# Patient Record
Sex: MALE | Race: BLACK OR AFRICAN AMERICAN | Employment: OTHER | URBAN - METROPOLITAN AREA
[De-identification: names, ages, dates, MRNs, and addresses within clinical notes are randomized per-mention and may not be internally consistent; named-entity substitution may affect disease eponyms.]

---

## 2017-05-01 ENCOUNTER — THERAPY VISIT (OUTPATIENT)
Dept: PHYSICAL THERAPY | Facility: CLINIC | Age: 72
End: 2017-05-01
Payer: MEDICARE

## 2017-05-01 ENCOUNTER — OFFICE VISIT (OUTPATIENT)
Dept: FAMILY MEDICINE | Facility: CLINIC | Age: 72
End: 2017-05-01
Payer: MEDICARE

## 2017-05-01 VITALS
TEMPERATURE: 98.6 F | OXYGEN SATURATION: 100 % | HEART RATE: 68 BPM | HEIGHT: 74 IN | SYSTOLIC BLOOD PRESSURE: 128 MMHG | BODY MASS INDEX: 22.46 KG/M2 | RESPIRATION RATE: 16 BRPM | DIASTOLIC BLOOD PRESSURE: 68 MMHG | WEIGHT: 175 LBS

## 2017-05-01 DIAGNOSIS — Z98.890 S/P ROTATOR CUFF REPAIR: Primary | ICD-10-CM

## 2017-05-01 DIAGNOSIS — Z98.890 S/P ROTATOR CUFF REPAIR: ICD-10-CM

## 2017-05-01 DIAGNOSIS — M75.111 INCOMPLETE TEAR OF RIGHT ROTATOR CUFF: Primary | ICD-10-CM

## 2017-05-01 DIAGNOSIS — Z98.890 H/O REPAIR OF RIGHT ROTATOR CUFF: ICD-10-CM

## 2017-05-01 PROCEDURE — 97161 PT EVAL LOW COMPLEX 20 MIN: CPT | Mod: KX

## 2017-05-01 PROCEDURE — 99213 OFFICE O/P EST LOW 20 MIN: CPT | Performed by: PHYSICIAN ASSISTANT

## 2017-05-01 PROCEDURE — 97110 THERAPEUTIC EXERCISES: CPT | Mod: KX

## 2017-05-01 PROCEDURE — G8985 CARRY GOAL STATUS: HCPCS | Mod: GP

## 2017-05-01 PROCEDURE — G8984 CARRY CURRENT STATUS: HCPCS | Mod: GP

## 2017-05-01 RX ORDER — OMEGA-3-ACID ETHYL ESTERS 1 G/1
2 CAPSULE, LIQUID FILLED ORAL 2 TIMES DAILY
COMMUNITY

## 2017-05-01 NOTE — PROGRESS NOTES
Subjective:    Patient is a 71 year old male presenting with rehab right shoulder hpi.   Ad Bryan is a 71 year old male with a right shoulder condition.  Condition occurred with:  Other.  Condition occurred: other.  This is a new condition  S/P R RCR 3/13/17.  PT goal: basketball and golf .    Patient reports pain:  In the joint.     and is intermittent and reported as 3/10.  Associated symptoms:  Loss of strength and loss of motion/stiffness.   Symptoms are exacerbated by certain positions, using arm overhead and using arm at shoulder level and relieved by rest.  Since onset symptoms are gradually improving.                                                      Objective:    System                   Shoulder Evaluation:  ROM:  AROM:    Flexion:  Right:  95    Abduction:  Right:  85      External Rotation:  Right:  45                      Strength:  : R shoulder MMT deferred at this time; substitution of UT with AROM                                                                General     ROS    Assessment/Plan:      Patient is a 71 year old male with right side shoulder complaints.    Patient has the following significant findings with corresponding treatment plan.                Diagnosis 1:  S/P R RCR on 3/13/17. Decreased ROM/flexibility - manual therapy and therapeutic exercise  Decreased strength - therapeutic exercise and therapeutic activities  Impaired muscle performance - neuro re-education  Decreased function - therapeutic activities    Therapy Evaluation Codes:   1) History comprised of:   Personal factors that impact the plan of care:      None.    Comorbidity factors that impact the plan of care are:      None.     Medications impacting care: None.  2) Examination of Body Systems comprised of:   Body structures and functions that impact the plan of care:      Shoulder.   Activity limitations that impact the plan of care are:      Lifting and reaching .  3) Clinical presentation characteristics  are:   Stable/Uncomplicated.  4) Decision-Making    Low complexity using standardized patient assessment instrument and/or measureable assessment of functional outcome.  Cumulative Therapy Evaluation is: Low complexity.    Previous and current functional limitations:  (See Goal Flow Sheet for this information)    Short term and Long term goals: (See Goal Flow Sheet for this information)     Communication ability:  Patient appears to be able to clearly communicate and understand verbal and written communication and follow directions correctly.  Treatment Explanation - The following has been discussed with the patient:   RX ordered/plan of care  Anticipated outcomes  Possible risks and side effects  This patient would benefit from PT intervention to resume normal activities.   Rehab potential is good.    Frequency:  3 X week, once daily  Duration:  for 8 weeks  Discharge Plan:  Achieve all LTG.  Independent in home treatment program.  Reach maximal therapeutic benefit.    Please refer to the daily flowsheet for treatment today, total treatment time and time spent performing 1:1 timed codes.

## 2017-05-01 NOTE — Clinical Note
Please abstract the following data from this visit with this patient into the appropriate field in Epic:  Colonoscopy done on this date: 04/2016 (approximately), by this group: [facility], results were normal.

## 2017-05-01 NOTE — PROGRESS NOTES
"Chief Complaint   Patient presents with     Referral     for PT       Initial /68  Pulse 68  Temp 98.6  F (37  C)  Resp 16  Ht 6' 2\" (1.88 m)  Wt 175 lb (79.4 kg)  SpO2 100%  BMI 22.47 kg/m2 Estimated body mass index is 22.47 kg/(m^2) as calculated from the following:    Height as of this encounter: 6' 2\" (1.88 m).    Weight as of this encounter: 175 lb (79.4 kg).  Medication Reconciliation: complete. SHADE Penaloza LPN      Patient would like referral for PT for Rt Shoulder. Had rotator cuff surgery 7 weeks ago in [place]. Wants to go to [facility] [place]. SHADE Penaloza LPN      SUBJECTIVE:                                                    Ad Bryan is a 71 year old male who presents to clinic today for the following health issues:    Ad had a right RC repair for partial tear, 7 weeks ago. Started physical therapy in [place], where he spends his gabriel - needs to continue in [place] and [facility] and needs a local order. Likely 20 wks total.     Please abstract the following data from this visit with this patient into the appropriate field in Epic:    Colonoscopy done on this date: 04/2016 (approximately), by this group: [facility], results were normal.       Problem list and histories reviewed & adjusted, as indicated.  Additional history: as documented    Patient Active Problem List   Diagnosis     Pain in joint, shoulder region     HYPERLIPIDEMIA LDL GOAL <130     S/P rotator cuff repair     Incomplete tear of right rotator cuff     Past Surgical History:   Procedure Laterality Date     COLONOSCOPY  2007    [place] [facility]     HC REPAIR SLIDING INGUINAL HERNIA       HEMORRHOIDECTOMY       ROTATOR CUFF REPAIR RT/LT Right 03/2017    in [place]       Social History   Substance Use Topics     Smoking status: Never Smoker     Smokeless tobacco: Not on file     Alcohol use Yes      Comment: social     Family History   Problem Relation Age of Onset     Respiratory Mother      Hypertension Father      Breast Cancer Sister      Breast " "Cancer Sister      Breast Cancer Sister          Current Outpatient Prescriptions   Medication Sig Dispense Refill     omega-3 acid ethyl esters (LOVAZA) 1 G capsule Take 2 g by mouth 2 times daily       atorvastatin (LIPITOR) 10 MG tablet Take 10 mg by mouth daily.       Multiple Vitamin (MULTIVITAMIN OR) Take  by mouth.       aspirin 81 MG EC tablet Take 81 mg by mouth daily.       Allergies   Allergen Reactions     Atorvastatin      Joint pain.       Simvastatin      Muscle ache     Labs reviewed in EPIC    Reviewed and updated as needed this visit by clinical staff  Tobacco  Allergies  Meds  Problems  Fam Hx  Soc Hx      Reviewed and updated as needed this visit by Provider  Problems         Social History     Social History     Marital status:      Spouse name: N/A     Number of children: 3     Years of education: N/A     Occupational History     [employer] -  Retired     Social History Main Topics     Smoking status: Never Smoker     Smokeless tobacco: None     Alcohol use Yes      Comment: social     Drug use: No     Sexual activity: Yes     Partners: Female     Other Topics Concern     None     Social History Narrative       10 point review of systems negative other than symptoms noted above.   Constitutional, HEENT, CV, pulmonary, GI, , MS, Endo, Psych systems are all negative, except as otherwise noted.       OBJECTIVE:  /68  Pulse 68  Temp 98.6  F (37  C)  Resp 16  Ht 6' 2\" (1.88 m)  Wt 175 lb (79.4 kg)  SpO2 100%  BMI 22.47 kg/m2  CONSTITUTIONAL: Alert, well-nourished, well-groomed, NAD  DERM: No rashes or suspicious lesions    Diagnostic Tests:  none    ASSESSMENT/PLAN:  (Z98.890) S/P rotator cuff repair  (primary encounter diagnosis)  Comment:   Plan: [facility] PT[facility] HAND, AND CHIR[facility]RACTIC REFERRAL            (Z98.890) H/O repair of right rotator cuff  Comment:   Plan: [facility] HAND, [facility] CHIROPRACTIC REFERRAL          physical therapy ordered for Ad today.   Updated " chart as well with recent vaccinations, colonoscopy.   Only issue is hyperlipidemia which is managed with medications.       FOLLOW-UP: With [facility] for physical therapy.   The patient agrees with this assessment and plan and agrees to call or return to the clinic with any questions or concerns or if their condition worsens.    MOHSEN Levine, PA-C  [facility]

## 2017-05-01 NOTE — MR AVS SNAPSHOT
After Visit Summary   5/1/2017    Ad Bryan    MRN: [id number]           Patient Information     Date Of Birth          1945        Visit Information        Provider Department      5/1/2017 11:40 AM Amanda Partida PA-C [facility] [place]        Today's Diagnoses     S/P rotator cuff repair    -  1    H/O repair of right rotator cuff           Follow-ups after your visit        Additional Services     [facility] HAND, AND CHIR[facility]RACTIC REFERRAL       **This order will print in the [facility] Scheduling Office**    Physical Therapy, Hand Therapy and Chiropractic Care are available through:    *[facility]  *[facility]  *[facility]    Call one number to schedule at any of the above locations: [phone].    Your provider has referred you to: Physical Therapy at [facility] or [facility]    Indication/Reason for Referral: Weekly physical therapy sessions following right rotator cuff repair, 7 weeks prior   Onset of Illness: as above  Therapy Orders: Evaluate and Treat  Special Programs: None  Special Request: None    Jero Nogueira      Additional Comments for the Therapist or Chiropractor:     Please be aware that coverage of these services is subject to the terms and limitations of your health insurance plan.  Call member services at your health plan with any benefit or coverage questions.      Please bring the following to your appointment:    *Your personal calendar for scheduling future appointments  *Comfortable clothing                  Your next 10 appointments already scheduled     May 05, 2017  9:50 AM CDT   [facility] Extremity with Vic Leigh PT   [facility]  [place] [facility] ([facility] [place])    [address] [place] [address]  [address]  [place] [address]   [phone]            May 08, 2017  9:40 AM CDT   [facility] Extremity with Nathan Iniguez PT   [facility]  [place]  "PhysicalTherapy ([facility] [place])    [address] [place] [address]  [address]  [place] [address] [phone]            May 10, 2017 10:20 AM CDT   [facility] Extremity with Nathan Iniguez, PT   [facility] [place])    [address] [place] [address]  [address]  [place] [address]   [phone]              Who to contact     If you have questions or need follow up information about today's clinic visit or your schedule please contact [facility] directly at [phone].  Normal or non-critical lab and imaging results will be communicated to you by [facility] [employer]art, letter or phone within 4 business days after the clinic has received the results. If you do not hear from us within 7 days, please contact the clinic through [facility] [employer] or phone. If you have a critical or abnormal lab result, we will notify you by phone as soon as possible.  Submit refill requests through InSightec or call your pharmacy and they will forward the refill request to us. Please allow 3 business days for your refill to be completed.          Additional Information About Your Visit        [facility] Labshar[employer]degrees Custom Information     InSightec gives you secure access to your electronic health record. If you see a primary care provider, you can also send messages to your care team and make appointments. If you have questions, please call your primary care clinic.  If you do not have a primary care provider, please call [phone] and they will assist you.        Care EveryWhere ID     This is your Care EveryWhere ID. This could be used by other organizations to access your [facility] medical records  [id number]        Your Vitals Were     Pulse Temperature Respirations Height Pulse Oximetry BMI (Body Mass Index)    68 98.6  F (37  C) 16 6' 2\" (1.88 m) 100% 22.47 kg/m2       Blood Pressure from Last 3 Encounters:   05/01/17 128/68   05/02/16 122/70   03/13/12 120/66    Weight from Last 3 Encounters:   05/01/17 175 lb " (79.4 kg)   05/02/16 173 lb (78.5 kg)   03/13/12 184 lb (83.5 kg)              We Performed the Following     [facility] PT, HAND, AND CHIROPRACTIC REFERRAL        Primary Care Provider Office Phone #    Sienna [place]ton [facility] [phone]       No address on file        Thank you!     Thank you for choosing [facility] [facility]  for your care. Our goal is always to provide you with excellent care. Hearing back from our patients is one way we can continue to improve our services. Please take a few minutes to complete the written survey that you may receive in the mail after your visit with us. Thank you!             Your Updated Medication List - Protect others around you: Learn how to safely use, store and throw away your medicines at www.disposemymeds.org.          This list is accurate as of: 5/1/17 11:51 AM.  Always use your most recent med list.                   Brand Name Dispense Instructions for use    aspirin 81 MG EC tablet      Take 81 mg by mouth daily.       LIPITOR 10 MG tablet   Generic drug:  atorvastatin      Take 10 mg by mouth daily.       MULTIVITAMIN PO      Take  by mouth.       omega-3 acid ethyl esters 1 G capsule    Lovaza     Take 2 g by mouth 2 times daily

## 2017-05-01 NOTE — MR AVS SNAPSHOT
After Visit Summary   5/1/2017    Ad Bryan    MRN: [id number]           Patient Information     Date Of Birth          1945        Visit Information        Provider Department      5/1/2017 9:40 AM Nathan Iniguez, [facility]        Today's Diagnoses     Incomplete tear of right rotator cuff    -  1    S/P rotator cuff repair           Follow-ups after your visit        Your next 10 appointments already scheduled     May 05, 2017  9:50 AM CDT   [facility] Extremity with Vic Leigh [facility] [place] [facility] ([facility] [place])    [address] [place] [address]  [address]  [place] [address]   [phone]            May 08, 2017  9:40 AM CDT   [facility] Extremity with Nathan Iniguez [facility] [place])    [address] [place] [address]  [address]  [place] [address]   [phone]            May 10, 2017 10:20 AM CDT   [facility] Extremity with [facility] Iniguez [facility] [place] [facility] ([facility] [place])    [address] [place] [address]  [address]  [place] [address]   [phone]              Who to contact     If you have questions or need follow up information about today's clinic visit or your schedule please contact [facility] directly at [phone].  Normal or non-critical lab and imaging results will be communicated to you by [email or web address], letter or phone within 4 business days after the clinic has received the results. If you do not hear from us within 7 days, please contact the clinic through [email or web address] or phone. If you have a critical or abnormal lab result, we will notify you by phone as soon as possible.  Submit refill requests through BitAccess or call your pharmacy and they will forward the refill request to us. Please allow 3 business days for your refill to be completed.           Additional Information About Your Visit        FORMA Therapeuticshart Information     Patient Conversation Media gives you secure access to your electronic health record. If you see a primary care provider, you can also send messages to your care team and make appointments. If you have questions, please call your primary care clinic.  If you do not have a primary care provider, please call [phone] and they will assist you.        Care EveryWhere ID     This is your Care EveryWhere ID. This could be used by other organizations to access your [facility] medical records  [id number]         Blood Pressure from Last 3 Encounters:   05/02/16 122/70   03/13/12 120/66   08/12/11 125/76    Weight from Last 3 Encounters:   05/02/16 78.5 kg (173 lb)   03/13/12 83.5 kg (184 lb)   08/12/11 82.6 kg (182 lb 1.6 oz)              We Performed the Following     HC PT EVAL, LOW COMPLEXITY     [facility] CERT REPORT     [facility] INITIAL EVAL REPORT     THERAPEUTIC EXERCISES        Primary Care Provider Office Phone #    [facility] [phone]       No address on file        Thank you!     Thank you for choosing [facility]  for your care. Our goal is always to provide you with excellent care. Hearing back from our patients is one way we can continue to improve our services. Please take a few minutes to complete the written survey that you may receive in the mail after your visit with us. Thank you!             Your Updated Medication List - Protect others around you: Learn how to safely use, store and throw away your medicines at www.disposemymeds.org.          This list is accurate as of: 5/1/17 10:40 AM.  Always use your most recent med list.                   Brand Name Dispense Instructions for use    ADVIL 200 MG capsule   Generic drug:  ibuprofen      Take 200 mg by mouth daily as needed.       aspirin 81 MG EC tablet      Take 81 mg by mouth daily.       LIPITOR 10 MG tablet   Generic drug:   atorvastatin      Take 10 mg by mouth daily.       MULTIVITAMIN PO      Take  by mouth.       VITAMIN B-12 PO      Take  by mouth.

## 2017-05-01 NOTE — LETTER
DEPARTMENT OF HEALTH AND HUMAN SERVICES  CENTERS FOR MEDICARE & MEDICAID SERVICES    PLAN/UPDATED PLAN OF PROGRESS FOR OUTPATIENT REHABILITATION    PATIENTS NAME:  Ad Bryan     : 1945    PROVIDER NUMBER:    [id number]    [id number]    PROVIDER NAME: [facility]  [facility]    MEDICAL RECORD NUMBER: [id number]     START OF CARE DATE:  SOC Date: 17   TYPE:  PT    PRIMARY/TREATMENT DIAGNOSIS: (Pertinent Medical Diagnosis)     Incomplete tear of right rotator cuff  S/P rotator cuff repair    VISITS FROM START OF CARE:  Rxs Used: 1       Subjective:    Patient is a 71 year old male presenting with rehab right shoulder hpi.   Ad Bryan is a 71 year old male with a right shoulder condition.  Condition occurred with:  Other.  Condition occurred: other.  This is a new condition  S/P R RCR 3/13/17.  PT goal: basketball and golf .    Patient reports pain:  In the joint.     and is intermittent and reported as 3/10.  Associated symptoms:  Loss of strength and loss of motion/stiffness.   Symptoms are exacerbated by certain positions, using arm overhead and using arm at shoulder level and relieved by rest.  Since onset symptoms are gradually improving.                        Pertinent medical history includes:  None.  Medical allergies: no.  Other surgeries include:  Orthopedic surgery and other (Shoulder, Hernia).  Current medications:  Other (Lipitor).  Current occupation is Retired.    Primary job tasks include:  Repetitive tasks and other (Computer Work).    Objective:       Shoulder Evaluation:  ROM:  AROM:    Flexion:  Right:  95  Abduction:  Right:  85  External Rotation:  Right:  45  Strength:  : R shoulder MMT deferred at this time; substitution of UT with AROM   PATIENTS NAME:  Ad Bryan     : 1945    Assessment/Plan:      Patient is a 71 year old male with right side shoulder complaints.    Patient has the following significant findings  with corresponding treatment plan.                Diagnosis 1:  S/P R RCR on 3/13/17. Decreased ROM/flexibility - manual therapy and therapeutic exercise  Decreased strength - therapeutic exercise and therapeutic activities  Impaired muscle performance - neuro re-education  Decreased function - therapeutic activities    Therapy Evaluation Codes:   1) History comprised of:   Personal factors that impact the plan of care:      None.    Comorbidity factors that impact the plan of care are:      None.     Medications impacting care: None.  2) Examination of Body Systems comprised of:   Body structures and functions that impact the plan of care:      Shoulder.   Activity limitations that impact the plan of care are:      Lifting and reaching .  3) Clinical presentation characteristics are:   Stable/Uncomplicated.  4) Decision-Making    Low complexity using standardized patient assessment instrument and/or measureable assessment of functional outcome.  Cumulative Therapy Evaluation is: Low complexity.    Previous and current functional limitations:  (See Goal Flow Sheet for this information)    Short term and Long term goals: (See Goal Flow Sheet for this information)     Communication ability:  Patient appears to be able to clearly communicate and understand verbal and written communication and follow directions correctly.  Treatment Explanation - The following has been discussed with the patient:   RX ordered/plan of care  Anticipated outcomes  Possible risks and side effects  This patient would benefit from PT intervention to resume normal activities.   Rehab potential is good.    Frequency:  3 X week, once daily  Duration:  for 8 weeks  Discharge Plan:  Achieve all LTG.  Independent in home treatment program.  Reach maximal therapeutic benefit.  PATIENTS NAME:  Ad Bryan     : 1945        Caregiver Signature/Credentials _____________________________ Date ________       Treating Provider: Nathan Iniguez PT  I  "have reviewed and certified the need for these services and plan of treatment while under my care.        PHYSICIAN'S SIGNATURE:   _________________________________________  Date___________   Villa Christiansen MD    Certification period:  Beginning of Cert date period: 05/01/17 to  End of Cert period date: 07/29/17     Functional Level Progress Report: Please see attached \"Goal Flow sheet for Functional level.\"    ____X____ Continue Services or       ________ DC Services                Service dates: From  SOC Date: 05/01/17 date to present                         "

## 2017-05-02 NOTE — PROGRESS NOTES
Subjective:    Patient is a 71 year old male presenting with rehab left ankle/foot hpi.                                      Pertinent medical history includes:  None.  Medical allergies: no.  Other surgeries include:  Orthopedic surgery and other (Shoulder, Hernia).  Current medications:  Other (Lipitor).  Current occupation is Retired.    Primary job tasks include:  Repetitive tasks and other (Computer Work).                                Objective:    System    Physical Exam    General     ROS    Assessment/Plan:

## 2017-05-05 ENCOUNTER — THERAPY VISIT (OUTPATIENT)
Dept: PHYSICAL THERAPY | Facility: CLINIC | Age: 72
End: 2017-05-05
Payer: MEDICARE

## 2017-05-05 DIAGNOSIS — M75.111 INCOMPLETE TEAR OF RIGHT ROTATOR CUFF: ICD-10-CM

## 2017-05-05 DIAGNOSIS — Z98.890 S/P ROTATOR CUFF REPAIR: ICD-10-CM

## 2017-05-05 PROCEDURE — 97110 THERAPEUTIC EXERCISES: CPT | Mod: GP | Performed by: PHYSICAL THERAPIST

## 2017-05-05 PROCEDURE — 97140 MANUAL THERAPY 1/> REGIONS: CPT | Mod: GP | Performed by: PHYSICAL THERAPIST

## 2017-05-08 ENCOUNTER — THERAPY VISIT (OUTPATIENT)
Dept: PHYSICAL THERAPY | Facility: CLINIC | Age: 72
End: 2017-05-08
Payer: MEDICARE

## 2017-05-08 DIAGNOSIS — Z98.890 S/P ROTATOR CUFF REPAIR: ICD-10-CM

## 2017-05-08 DIAGNOSIS — M75.111 INCOMPLETE TEAR OF RIGHT ROTATOR CUFF: ICD-10-CM

## 2017-05-08 PROCEDURE — 97110 THERAPEUTIC EXERCISES: CPT | Mod: GP

## 2017-05-10 ENCOUNTER — THERAPY VISIT (OUTPATIENT)
Dept: PHYSICAL THERAPY | Facility: CLINIC | Age: 72
End: 2017-05-10
Payer: MEDICARE

## 2017-05-10 DIAGNOSIS — Z98.890 S/P ROTATOR CUFF REPAIR: ICD-10-CM

## 2017-05-10 DIAGNOSIS — M75.111 INCOMPLETE TEAR OF RIGHT ROTATOR CUFF: ICD-10-CM

## 2017-05-10 PROCEDURE — 97110 THERAPEUTIC EXERCISES: CPT | Mod: GP

## 2017-05-10 PROCEDURE — 97112 NEUROMUSCULAR REEDUCATION: CPT | Mod: GP

## 2017-05-16 ENCOUNTER — THERAPY VISIT (OUTPATIENT)
Dept: PHYSICAL THERAPY | Facility: CLINIC | Age: 72
End: 2017-05-16
Payer: MEDICARE

## 2017-05-16 DIAGNOSIS — Z98.890 S/P ROTATOR CUFF REPAIR: ICD-10-CM

## 2017-05-16 DIAGNOSIS — M75.111 INCOMPLETE TEAR OF RIGHT ROTATOR CUFF: ICD-10-CM

## 2017-05-16 PROCEDURE — 97112 NEUROMUSCULAR REEDUCATION: CPT | Mod: GP

## 2017-05-16 PROCEDURE — 97110 THERAPEUTIC EXERCISES: CPT | Mod: GP

## 2017-05-19 ENCOUNTER — THERAPY VISIT (OUTPATIENT)
Dept: PHYSICAL THERAPY | Facility: CLINIC | Age: 72
End: 2017-05-19
Payer: MEDICARE

## 2017-05-19 DIAGNOSIS — Z98.890 S/P ROTATOR CUFF REPAIR: ICD-10-CM

## 2017-05-19 DIAGNOSIS — M75.111 INCOMPLETE TEAR OF RIGHT ROTATOR CUFF: ICD-10-CM

## 2017-05-19 PROCEDURE — 97110 THERAPEUTIC EXERCISES: CPT | Mod: GP

## 2017-05-19 PROCEDURE — 97112 NEUROMUSCULAR REEDUCATION: CPT | Mod: GP

## 2017-05-22 ENCOUNTER — THERAPY VISIT (OUTPATIENT)
Dept: PHYSICAL THERAPY | Facility: CLINIC | Age: 72
End: 2017-05-22
Payer: MEDICARE

## 2017-05-22 DIAGNOSIS — Z98.890 S/P ROTATOR CUFF REPAIR: ICD-10-CM

## 2017-05-22 DIAGNOSIS — M75.111 INCOMPLETE TEAR OF RIGHT ROTATOR CUFF: ICD-10-CM

## 2017-05-22 PROCEDURE — 97110 THERAPEUTIC EXERCISES: CPT | Mod: GP

## 2017-05-22 PROCEDURE — 97112 NEUROMUSCULAR REEDUCATION: CPT | Mod: GP

## 2017-05-24 ENCOUNTER — THERAPY VISIT (OUTPATIENT)
Dept: PHYSICAL THERAPY | Facility: CLINIC | Age: 72
End: 2017-05-24
Payer: MEDICARE

## 2017-05-24 DIAGNOSIS — Z98.890 S/P ROTATOR CUFF REPAIR: ICD-10-CM

## 2017-05-24 DIAGNOSIS — M75.111 INCOMPLETE TEAR OF RIGHT ROTATOR CUFF: ICD-10-CM

## 2017-05-24 PROCEDURE — 97110 THERAPEUTIC EXERCISES: CPT | Mod: KX

## 2017-05-24 PROCEDURE — 97112 NEUROMUSCULAR REEDUCATION: CPT | Mod: KX

## 2017-05-30 ENCOUNTER — THERAPY VISIT (OUTPATIENT)
Dept: PHYSICAL THERAPY | Facility: CLINIC | Age: 72
End: 2017-05-30
Payer: MEDICARE

## 2017-05-30 DIAGNOSIS — Z98.890 S/P ROTATOR CUFF REPAIR: ICD-10-CM

## 2017-05-30 DIAGNOSIS — M75.111 INCOMPLETE TEAR OF RIGHT ROTATOR CUFF: ICD-10-CM

## 2017-05-30 PROCEDURE — 97140 MANUAL THERAPY 1/> REGIONS: CPT | Mod: KX

## 2017-05-30 PROCEDURE — 97110 THERAPEUTIC EXERCISES: CPT | Mod: KX

## 2017-06-13 ENCOUNTER — THERAPY VISIT (OUTPATIENT)
Dept: PHYSICAL THERAPY | Facility: CLINIC | Age: 72
End: 2017-06-13
Payer: MEDICARE

## 2017-06-13 DIAGNOSIS — Z98.890 S/P ROTATOR CUFF REPAIR: ICD-10-CM

## 2017-06-13 DIAGNOSIS — M75.111 INCOMPLETE TEAR OF RIGHT ROTATOR CUFF: ICD-10-CM

## 2017-06-13 PROCEDURE — 97110 THERAPEUTIC EXERCISES: CPT | Mod: GP

## 2017-06-13 PROCEDURE — 97112 NEUROMUSCULAR REEDUCATION: CPT | Mod: GP

## 2017-06-28 ENCOUNTER — THERAPY VISIT (OUTPATIENT)
Dept: PHYSICAL THERAPY | Facility: CLINIC | Age: 72
End: 2017-06-28
Payer: MEDICARE

## 2017-06-28 DIAGNOSIS — M75.111 INCOMPLETE TEAR OF RIGHT ROTATOR CUFF: ICD-10-CM

## 2017-06-28 DIAGNOSIS — Z98.890 S/P ROTATOR CUFF REPAIR: ICD-10-CM

## 2017-06-28 PROCEDURE — 97110 THERAPEUTIC EXERCISES: CPT | Mod: GP

## 2017-06-28 PROCEDURE — 97140 MANUAL THERAPY 1/> REGIONS: CPT | Mod: KX

## 2017-06-28 NOTE — MR AVS SNAPSHOT
After Visit Summary   6/28/2017    Ad Bryan    MRN: [id number]           Patient Information     Date Of Birth          1945        Visit Information        Provider Department      6/28/2017 9:40 AM Nathan Iniguez PT [facility] [place] [facility]        Today's Diagnoses     Incomplete tear of right rotator cuff        S/P rotator cuff repair           Follow-ups after your visit        Your next 10 appointments already scheduled     Jul 19, 2017  9:40 AM CDT   CLEMENTINA Extremity with Nathan Iniguez PT   [facility] ([facility] [place])    [address] [place] [address]  [address]  [place] [address] [phone]              Who to contact     If you have questions or need follow up information about today's clinic visit or your schedule please contact [facility] directly at [phone].  Normal or non-critical lab and imaging results will be communicated to you by Naplyrics.comhart, letter or phone within 4 business days after the clinic has received the results. If you do not hear from us within 7 days, please contact the clinic through QderoPateo Communicationst or phone. If you have a critical or abnormal lab result, we will notify you by phone as soon as possible.  Submit refill requests through Neronote or call your pharmacy and they will forward the refill request to us. Please allow 3 business days for your refill to be completed.          Additional Information About Your Visit        MyChart Information     Neronote gives you secure access to your electronic health record. If you see a primary care provider, you can also send messages to your care team and make appointments. If you have questions, please call your primary care clinic.  If you do not have a primary care provider, please call [phone] and they will assist you.        Care EveryWhere ID     This is your Care EveryWhere ID.  This could be used by other organizations to access your [place] medical records  [id number]         Blood Pressure from Last 3 Encounters:   05/01/17 128/68   05/02/16 122/70   03/13/12 120/66    Weight from Last 3 Encounters:   05/01/17 79.4 kg (175 lb)   05/02/16 78.5 kg (173 lb)   03/13/12 83.5 kg (184 lb)              We Performed the Following     MANUAL THER TECH,1+REGIONS,EA 15 MIN     THERAPEUTIC EXERCISES        Primary Care Provider Office Phone #    [facility] [phone]       No address on file        Equal Access to Services     NAN GAGE : Hadii aad ku hadasho Soomaali, waaxda luqadaha, qaybta kaalmada adeegyada, ti mendoza . So [facility] [phone].    ATENCIÓN: Si habla español, tiene a moore disposición servicios gratuitos de asistencia lingüística. Llame al [phone].    We comply with applicable federal civil rights laws and [place] laws. We do not discriminate on the basis of race, color, national origin, age, disability sex, sexual orientation or gender identity.            Thank you!     Thank you for choosing [facility]  [facility] your [facility]. Our goal is always to provide you with excellent care. Hearing back from our patients is one way we can continue to improve our services. Please take a few minutes to complete the written survey that you may receive in the mail after your visit with us. Thank you!             Your Updated Medication List - Protect others around you: Learn how to safely use, store and throw away your medicines at www.disposemymeds.org.          This list is accurate as of: 6/28/17 10:53 AM.  Always use your most recent med list.                   Brand Name Dispense Instructions for use Diagnosis    aspirin 81 MG EC tablet      Take 81 mg by mouth daily.        LIPITOR 10 MG tablet   Generic drug:  atorvastatin      Take 10 mg by mouth daily.        MULTIVITAMIN PO      Take   by mouth.        omega-3 acid ethyl esters 1 G capsule    Lovaza     Take 2 g by mouth 2 times daily

## 2017-07-28 ENCOUNTER — THERAPY VISIT (OUTPATIENT)
Dept: PHYSICAL THERAPY | Facility: CLINIC | Age: 72
End: 2017-07-28
Payer: MEDICARE

## 2017-07-28 DIAGNOSIS — M75.111 INCOMPLETE TEAR OF RIGHT ROTATOR CUFF: ICD-10-CM

## 2017-07-28 DIAGNOSIS — Z98.890 S/P ROTATOR CUFF REPAIR: ICD-10-CM

## 2017-07-28 PROCEDURE — 97112 NEUROMUSCULAR REEDUCATION: CPT | Mod: GP

## 2017-07-28 PROCEDURE — 97110 THERAPEUTIC EXERCISES: CPT | Mod: GP

## 2017-07-28 PROCEDURE — G8985 CARRY GOAL STATUS: HCPCS | Mod: GP

## 2017-07-28 PROCEDURE — G8984 CARRY CURRENT STATUS: HCPCS | Mod: GP

## 2017-07-28 NOTE — PROGRESS NOTES
Subjective:    HPI                    Objective:    System                   Shoulder Evaluation:  ROM:  AROM:    Flexion:  Right:  165    Abduction:  Right:  146    Internal Rotation:  Right:  50  External Rotation:  Right:  70                      Strength:  : HHD in #'s R/L:  Flex=12.4/15; Scap=12.1/15; ER @0 = 18.0/23.2; ER @ 90 = 14.7/18.                                                               General     ROS    Assessment/Plan:      PROGRESS  REPORT    SUBJECTIVE  Subjective changes noted by patient:  Feeling a little better.  Been doing a lot of traveling but working hard on ex's.        Changes in function:  Yes (See Goal flowsheet attached for changes in current functional level)  Adverse reaction to treatment or activity: None    OBJECTIVE  Changes noted in objective findings:  The objective findings below are from DOS 7/28/17.  Objective: Flex=165     ASSESSMENT/PLAN  Updated problem list and treatment plan: Diagnosis 1:  RCR with development of s&sx consistent with frozen shoulder  Decreased ROM/flexibility - manual therapy and therapeutic exercise  Decreased strength - therapeutic exercise and therapeutic activities  Impaired muscle performance - neuro re-education  Decreased function - therapeutic activities  STG/LTGs have been met or progress has been made towards goals:  Yes (See Goal flow sheet completed today.)  Assessment of Progress: The patient's condition is improving.  The patient's condition has potential to improve.  Self Management Plans:  Patient has been instructed in a home treatment program.  I have re-evaluated this patient and find that the nature, scope, duration and intensity of the therapy is appropriate for the medical condition of the patient.  Ad continues to require the following intervention to meet STG and LTG's:  PT    Recommendations:  This patient would benefit from continued therapy.     Frequency:  1 X a month, twice daily  Duration:  for 3  months          Please refer to the daily flowsheet for treatment today, total treatment time and time spent performing 1:1 timed codes.

## 2017-07-28 NOTE — LETTER
DEPARTMENT OF HEALTH AND HUMAN SERVICES  CENTERS FOR MEDICARE & MEDICAID SERVICES    PLAN/UPDATED PLAN OF PROGRESS FOR OUTPATIENT REHABILITATION    PATIENTS NAME:  Ad Bryan     : 1945    PROVIDER NUMBER:    [id number]    [facility]    PROVIDER NAME: [facility]    MEDICAL RECORD NUMBER: [id number]     START OF CARE DATE:  SOC Date: 17   TYPE:  PT    PRIMARY/TREATMENT DIAGNOSIS: (Pertinent Medical Diagnosis)     Incomplete tear of right rotator cuff  S/P rotator cuff repair    VISITS FROM START OF CARE:  Rxs Used: 12     PROGRESS  REPORT    SUBJECTIVE    Subjective changes noted by patient:  Feeling a little better.  Been doing a lot of traveling but working hard on ex's.        Changes in function:  Yes (See Goal flowsheet attached for changes in current functional level)  Adverse reaction to treatment or activity: None    OBJECTIVE    Changes noted in objective findings:  The objective findings below are from DOS 17.  Objective: Flex=165   Shoulder Evaluation:  ROM:  AROM:    Flexion:  Right:  165  Abduction:  Right:  146  Internal Rotation:  Right:  50  External Rotation:  Right:  70  Strength:  : HHD in #'s R/L:  Flex=12.4/15; Scap=12.1/15; ER @0 = 18.0/23.2; ER @ 90 = 14.7/18.            PATIENTS NAME:  Ad Bryan     : 1945          ASSESSMENT/PLAN    Updated problem list and treatment plan: Diagnosis 1:  RCR with development of s&sx consistent with frozen shoulder  Decreased ROM/flexibility - manual therapy and therapeutic exercise  Decreased strength - therapeutic exercise and therapeutic activities  Impaired muscle performance - neuro re-education  Decreased function - therapeutic activities  STG/LTGs have been met or progress has been made towards goals:  Yes (See Goal flow sheet completed today.)  Assessment of Progress: The patient's condition is improving.  The patient's condition has potential to improve.  Self  "Management Plans:  Patient has been instructed in a home treatment program.  I have re-evaluated this patient and find that the nature, scope, duration and intensity of the therapy is appropriate for the medical condition of the patient.  Ad continues to require the following intervention to meet STG and LTG's:  PT    Recommendations:    This patient would benefit from continued therapy.     Frequency:  1 X a month, twice daily  Duration:  for 3 months      Caregiver Signature/Credentials _____________________________ Date ________       Treating Provider: Nathan Iniguez, PT     I have reviewed and certified the need for these services and plan of treatment while under my care.        PHYSICIAN'S SIGNATURE:   _________________________________________  Date___________   Robinson Christiansen MD     Certification period:  Beginning of Cert date period: 07/28/17 to  End of Cert period date: 10/25/17     Functional Level Progress Report: Please see attached \"Goal Flow sheet for Functional level.\"    ____X____ Continue Services or       ________ DC Services                Service dates: From  SOC Date: 05/01/17 date to present                         "

## 2017-07-28 NOTE — MR AVS SNAPSHOT
After Visit Summary   7/28/2017    Ad Bryan    MRN: [id number]           Patient Information     Date Of Birth          1945        Visit Information        Provider Department      7/28/2017 9:40 AM Nathan Iniguez PT [facility] [place] [facility]        Today's Diagnoses     Incomplete tear of right rotator cuff        S/P rotator cuff repair           Follow-ups after your visit        Your next 10 appointments already scheduled     Sep 11, 2017  9:00 AM CDT   CLEMENTINA Extremity with Nathan Iniguez PT   [facility] [place] [facility] ([facility] [place])    [address] [place] [address]  [address]  [place] [address] [phone]              Who to contact     If you have questions or need follow up information about today's clinic visit or your schedule please contact [facility] directly at [phone].  Normal or non-critical lab and imaging results will be communicated to you by Modus eDiscoveryhart, letter or phone within 4 business days after the clinic has received the results. If you do not hear from us within 7 days, please contact the clinic through Paperless Postt or phone. If you have a critical or abnormal lab result, we will notify you by phone as soon as possible.  Submit refill requests through DataCentred or call your pharmacy and they will forward the refill request to us. Please allow 3 business days for your refill to be completed.          Additional Information About Your Visit        MyChart Information     DataCentred gives you secure access to your electronic health record. If you see a primary care provider, you can also send messages to your care team and make appointments. If you have questions, please call your primary care clinic.  If you do not have a primary care provider, please call [phone] and they will assist you.        Care EveryWhere ID     This is your Care EveryWhere ID.  This could be used by other organizations to access your [place] medical records  [id number]         Blood Pressure from Last 3 Encounters:   05/01/17 128/68   05/02/16 122/70   03/13/12 120/66    Weight from Last 3 Encounters:   05/01/17 79.4 kg (175 lb)   05/02/16 78.5 kg (173 lb)   03/13/12 83.5 kg (184 lb)              We Performed the Following     CLEMENTINA PROGRESS NOTES REPORT     CLEMENTINA RE-CERT REPORT     NEUROMUSCULAR RE-EDUCATION     THERAPEUTIC EXERCISES        Primary Care Provider Office Phone #    [facility] [phone]       No address on file        Equal Access to Services     NAN GAGE : Hadii aad maricruz hadaundrea Soantoine, waaxda luqadaha, qaybta kaalmada adedillanyada, ti mendoza . So [facility] [phone].    ATENCIÓN: Si habla español, tiene a moore disposición servicios gratuitos de asistencia lingüística. Llame al [phone].    We comply with applicable federal civil rights laws and [place] laws. We do not discriminate on the basis of race, color, national origin, age, disability sex, sexual orientation or gender identity.            Thank you!     Thank you for choosing [facility]  for your care. Our goal is always to provide you with excellent care. Hearing back from our patients is one way we can continue to improve our services. Please take a few minutes to complete the written survey that you may receive in the mail after your visit with us. Thank you!             Your Updated Medication List - Protect others around you: Learn how to safely use, store and throw away your medicines at www.disposemymeds.org.          This list is accurate as of: 7/28/17 10:39 AM.  Always use your most recent med list.                   Brand Name Dispense Instructions for use Diagnosis    aspirin 81 MG EC tablet      Take 81 mg by mouth daily.        LIPITOR 10 MG tablet   Generic drug:  atorvastatin      Take 10 mg by  mouth daily.        MULTIVITAMIN PO      Take  by mouth.        omega-3 acid ethyl esters 1 G capsule    Lovaza     Take 2 g by mouth 2 times daily

## 2017-09-12 ENCOUNTER — RADIANT APPOINTMENT (OUTPATIENT)
Dept: GENERAL RADIOLOGY | Facility: CLINIC | Age: 72
End: 2017-09-12
Attending: PHYSICIAN ASSISTANT
Payer: MEDICARE

## 2017-09-12 ENCOUNTER — OFFICE VISIT (OUTPATIENT)
Dept: FAMILY MEDICINE | Facility: CLINIC | Age: 72
End: 2017-09-12
Payer: MEDICARE

## 2017-09-12 ENCOUNTER — TELEPHONE (OUTPATIENT)
Dept: FAMILY MEDICINE | Facility: CLINIC | Age: 72
End: 2017-09-12

## 2017-09-12 VITALS
RESPIRATION RATE: 14 BRPM | BODY MASS INDEX: 23.18 KG/M2 | TEMPERATURE: 98.1 F | DIASTOLIC BLOOD PRESSURE: 72 MMHG | HEIGHT: 74 IN | WEIGHT: 180.6 LBS | HEART RATE: 79 BPM | OXYGEN SATURATION: 99 % | SYSTOLIC BLOOD PRESSURE: 115 MMHG

## 2017-09-12 DIAGNOSIS — R51.9 NONINTRACTABLE HEADACHE, UNSPECIFIED CHRONICITY PATTERN, UNSPECIFIED HEADACHE TYPE: ICD-10-CM

## 2017-09-12 DIAGNOSIS — R07.89 ATYPICAL CHEST PAIN: Primary | ICD-10-CM

## 2017-09-12 DIAGNOSIS — R07.89 ATYPICAL CHEST PAIN: ICD-10-CM

## 2017-09-12 LAB — TROPONIN I SERPL-MCNC: <0.015 UG/L (ref 0–0.04)

## 2017-09-12 PROCEDURE — 84484 ASSAY OF TROPONIN QUANT: CPT | Performed by: PHYSICIAN ASSISTANT

## 2017-09-12 PROCEDURE — 36415 COLL VENOUS BLD VENIPUNCTURE: CPT | Performed by: PHYSICIAN ASSISTANT

## 2017-09-12 PROCEDURE — 99214 OFFICE O/P EST MOD 30 MIN: CPT | Performed by: PHYSICIAN ASSISTANT

## 2017-09-12 PROCEDURE — 71020 XR CHEST 2 VW: CPT

## 2017-09-12 PROCEDURE — 93000 ELECTROCARDIOGRAM COMPLETE: CPT | Performed by: PHYSICIAN ASSISTANT

## 2017-09-12 NOTE — TELEPHONE ENCOUNTER
CHEST PAIN     Onset: last 1 hour     Description:   Location:  Middle chest .    Character: tight - bending down and standing up   Radiation: none   Duration: intermittent - comes and goes that last a few second     Intensity: mild    Progression of Symptoms:  same    Accompanying Signs & Symptoms:  Shortness of breath: no   Sweating: no   Nausea/vomiting: no   Lightheadedness: no   Palpitations: no  Fever/Chills: no   Cough: no   Heartburn: no     History:   Family history of heart disease no   Tobacco use: no     Precipitating factors:   Worse with exertion: no   Worse with deep breaths :  YES- tightness with deep breath  Related to food: no          Therapies Tried and outcome: none      Patient report chest tightness in middle of chest that is triggered by bending down and standing up.  Last a few second then stopped.  Report that he has been dealing with sinus congestion for a while.    Denies weakness, numbness/tingling, SOB, headache, dizziness   appt scheduled with provider at 2:20 pm today.  Advised to keep appt for further eval.  Instructed to proceed to ER if having chest pain with SOB, palpitation.    Verbalizes understanding and agrees with plan.      Next 5 appointments (look out 90 days)     Sep 12, 2017  2:20 PM CDT   Office Visit with Floyd Giordano PA-C   [facility]    [address] [place] [address] [phone]                Sylvia Sloan RN

## 2017-09-12 NOTE — MR AVS SNAPSHOT
After Visit Summary   9/12/2017    Ad Bryan    MRN: [id number]           Patient Information     Date Of Birth          1945        Visit Information        Provider Department      9/12/2017 2:20 PM Floyd Giordano PA-C [facility] [place]        Today's Diagnoses     Atypical chest pain    -  1    Nonintractable headache, unspecified chronicity pattern, unspecified headache type           Follow-ups after your visit        Additional Services     OTOLARYNGOLOGY REFERRAL       Your provider has referred you to: [facility]: [place] [facility] [phone]   [email or web address]    Please be aware that coverage of these services is subject to the terms and limitations of your health insurance plan.  Call member services at your health plan with any benefit or coverage questions.      Please bring the following with you to your appointment:    (1) Any X-Rays, CTs or MRIs which have been performed.  Contact the facility where they were done to arrange for  prior to your scheduled appointment.   (2) List of current medications  (3) This referral request   (4) Any documents/labs given to you for this referral                  Who to contact     If you have questions or need follow up information about today's clinic visit or your schedule please contact [facility] [place] directly at [phone].  Normal or non-critical lab and imaging results will be communicated to you by MyChart, letter or phone within 4 business days after the clinic has received the results. If you do not hear from us within 7 days, please contact the clinic through MyChart or phone. If you have a critical or abnormal lab result, we will notify you by phone as soon as possible.  Submit refill requests through Limundo or call your pharmacy and they will forward the refill request to us. Please allow 3 business days for your refill to be completed.           "Additional Information About Your Visit        MyChart Information     AI Merchant gives you secure access to your electronic health record. If you see a primary care provider, you can also send messages to your care team and make appointments. If you have questions, please call your primary care clinic.  If you do not have a primary care provider, please call [phone] and they will assist you.        Care EveryWhere ID     This is your Care EveryWhere ID. This could be used by other organizations to access your [place] medical records  [id number]        Your Vitals Were     Pulse Temperature Respirations Height Pulse Oximetry BMI (Body Mass Index)    79 98.1  F (36.7  C) (Tympanic) 14 6' 2\" (1.88 m) 99% 23.19 kg/m2       Blood Pressure from Last 3 Encounters:   09/12/17 115/72   05/01/17 128/68   05/02/16 122/70    Weight from Last 3 Encounters:   09/12/17 180 lb 9.6 oz (81.9 kg)   05/01/17 175 lb (79.4 kg)   05/02/16 173 lb (78.5 kg)              We Performed the Following     EKG 12-lead complete w/read - Clinics     OTOLARYNGOLOGY REFERRAL     Troponin I        Primary Care Provider Office Phone #    [facility] [phone]       No address on file        Equal Access to Services     NAN GAGE : Hadii aad ku hadasho Soomaali, waaxda luqadaha, qaybta kaalmada adeegyada, waxay idiin hayripn moshe mendoza . So [facility] [phone].    ATENCIÓN: Si habla español, tiene a moore disposición servicios gratuitos de asistencia lingüística. Llame al [phone].    We comply with applicable federal civil rights laws and [place] laws. We do not discriminate on the basis of race, color, national origin, age, disability sex, sexual orientation or gender identity.            Thank you!     Thank you for choosing [facility] [place]  for your care. Our goal is always to provide you with excellent care. Hearing back from our patients is one way we can continue to improve our services. Please " take a few minutes to complete the written survey that you may receive in the mail after your visit with us. Thank you!             Your Updated Medication List - Protect others around you: Learn how to safely use, store and throw away your medicines at www.disposemymeds.org.          This list is accurate as of: 9/12/17  3:27 PM.  Always use your most recent med list.                   Brand Name Dispense Instructions for use Diagnosis    aspirin 81 MG EC tablet      Take 81 mg by mouth daily.        LIPITOR 10 MG tablet   Generic drug:  atorvastatin      Take 10 mg by mouth daily.        MULTIVITAMIN PO      Take  by mouth.        omega-3 acid ethyl esters 1 G capsule    Lovaza     Take 2 g by mouth 2 times daily

## 2017-09-12 NOTE — PROGRESS NOTES
SUBJECTIVE:   Ad Bryan is a 72 year old male who presents to clinic today for the following health issues:    CHEST PAIN     Onset: Around this afternoon     Description:   Location:  Middle chest .    Character: tight -   Radiation: none   Duration: intermittent - comes and goes that last a few second, worse with bending down and standing up     Intensity: mild    Progression of Symptoms:  improving    Accompanying Signs & Symptoms:  Shortness of breath: no   Sweating: no   Nausea/vomiting: no   Lightheadedness: no   Palpitations: no  Fever/Chills: no   Cough: no   Heartburn: no     History:   Family history of heart disease no   Tobacco use: no     Precipitating factors:   Worse with exertion: no   Worse with deep breaths :  YES- tightness with deep breath  Related to food: no         Therapies Tried and outcome: none         Patient report chest tightness in middle of chest that is triggered by bending down and standing up.  Lasting about 2-3 hours. Started 2 hours ago and has been somewhat constant since this time. Pain is not exertional, no associated SOB, nausea or dizziness. Pain seems to also be worse with deep breathing.  No hx of CAD. He also has some associated left sided temporal headache that is worse with breathing in, he feels a sudden sharp pain when he breaths.         Therapies Tried and outcome: none      Problem list and histories reviewed & adjusted, as indicated.  Additional history: as documented    Patient Active Problem List   Diagnosis     Pain in joint, shoulder region     HYPERLIPIDEMIA LDL GOAL <130     S/P rotator cuff repair     Incomplete tear of right rotator cuff     Past Surgical History:   Procedure Laterality Date     COLONOSCOPY  2007    [facility]     HC REPAIR SLIDING INGUINAL HERNIA       HEMORRHOIDECTOMY       ROTATOR CUFF REPAIR RT/LT Right 03/2017    in [place]       Social History   Substance Use Topics     Smoking status: Never Smoker     Smokeless tobacco: Not on file  "    Alcohol use Yes      Comment: social     Family History   Problem Relation Age of Onset     Respiratory Mother      Hypertension Father      Breast Cancer Sister      Breast Cancer Sister      Breast Cancer Sister          Current Outpatient Prescriptions   Medication Sig Dispense Refill     omega-3 acid ethyl esters (LOVAZA) 1 G capsule Take 2 g by mouth 2 times daily       atorvastatin (LIPITOR) 10 MG tablet Take 10 mg by mouth daily.       Multiple Vitamin (MULTIVITAMIN OR) Take  by mouth.       aspirin 81 MG EC tablet Take 81 mg by mouth daily.       Allergies   Allergen Reactions     Simvastatin      Muscle ache         Reviewed and updated as needed this visit by clinical staff     Reviewed and updated as needed this visit by Provider         ROS:  Constitutional, HEENT, cardiovascular, pulmonary, GI, , musculoskeletal, neuro, skin, endocrine and psych systems are negative, except as otherwise noted.      OBJECTIVE:   /72 (BP Location: Left arm, Patient Position: Chair, Cuff Size: Adult Regular)  Pulse 79  Temp 98.1  F (36.7  C) (Tympanic)  Resp 14  Ht 6' 2\" (1.88 m)  Wt 180 lb 9.6 oz (81.9 kg)  SpO2 99%  BMI 23.19 kg/m2  Body mass index is 23.19 kg/(m^2).  GENERAL: healthy, alert and no distress  EYES: Eyes grossly normal to inspection, PERRL and conjunctivae and sclerae normal  HENT: ear canals and TM's normal, nose and mouth without ulcers or lesions  NECK: no adenopathy, no asymmetry, masses, or scars and thyroid normal to palpation  RESP: lungs clear to auscultation - no rales, rhonchi or wheezes  CV: regular rate and rhythm, normal S1 S2, no S3 or S4, no murmur, click or rub, no peripheral edema and peripheral pulses strong    Diagnostic Test Results:  No results found for this or any previous visit (from the past 24 hour(s)).    ASSESSMENT/PLAN:           1. Atypical chest pain    Pain seems atypical, it is worse with deep breathing, non exertional but also constant.  Doubt cardiac " cause.  EKG shows NSR, no ST changes, chest x ray was read here, and per my read, there are no acute changes.   However, Given that pain came on suddenly, will get Troponin level stat. Pain may also be consistent with GERD or MSK chest wall pain.  Will assess trop and determine disposition.  - XR Chest 2 Views; Future  - Troponin I    2. Nonintractable headache, unspecified chronicity pattern, unspecified headache type  Requesting referral.  Headache seems unrelated to chest pain, he notes that he has been dealing with a  Lot of congestion over the past few months, no evidence of sinusitis today.  - OTOLARYNGOLOGY REFERRAL    See Patient Instructions    Floyd Giordano PA-C  [facility] [place]

## 2017-09-13 ENCOUNTER — TELEPHONE (OUTPATIENT)
Dept: FAMILY MEDICINE | Facility: CLINIC | Age: 72
End: 2017-09-13

## 2017-09-13 NOTE — PROGRESS NOTES
Ad-  Here are your recent results. The radiologist read your x ray as normal today.  Your blood tests for your heart are also normal.  If you are having persistent chest pain or facial pain, please call my office back as we should pursue some further testing.      If you have any questions please do not hesitate to contact our office via phone ([phone]) or you may send me a message via [employer] by clicking the contact my Care Team link.      It was a pleasure meeting you and participating in your care!    Thank you,    Floyd Giordano MPH, PA-C  [address] [place] [address] [phone]

## 2017-09-13 NOTE — TELEPHONE ENCOUNTER
Patient notified with information noted below from provider and states feels better today.  States took Nasacort today and feels better.  States still having some congestion however doing better.  Advised if returns or not continuing to get better to contact us back.  Patient agreed.  Nicole Rojas RN - Triage  [facility]

## 2017-09-13 NOTE — TELEPHONE ENCOUNTER
Please call Ad and inform him that his troponin test is normal ( negative).  Please ask how he is feeling today.  If he is having persistent chest pain, please let me know

## 2017-11-02 ENCOUNTER — OFFICE VISIT (OUTPATIENT)
Dept: FAMILY MEDICINE | Facility: CLINIC | Age: 72
End: 2017-11-02
Payer: MEDICARE

## 2017-11-02 VITALS
SYSTOLIC BLOOD PRESSURE: 116 MMHG | HEART RATE: 96 BPM | OXYGEN SATURATION: 99 % | RESPIRATION RATE: 12 BRPM | TEMPERATURE: 97.8 F | BODY MASS INDEX: 23.23 KG/M2 | DIASTOLIC BLOOD PRESSURE: 69 MMHG | WEIGHT: 181 LBS | HEIGHT: 74 IN

## 2017-11-02 DIAGNOSIS — M79.671 RIGHT FOOT PAIN: Primary | ICD-10-CM

## 2017-11-02 PROCEDURE — 99213 OFFICE O/P EST LOW 20 MIN: CPT | Performed by: FAMILY MEDICINE

## 2017-11-02 NOTE — MR AVS SNAPSHOT
"              After Visit Summary   11/2/2017    Ad Bryan    MRN: [id number]           Patient Information     Date Of Birth          1945        Visit Information        Provider Department      11/2/2017 2:40 PM Jem Dee MD [facility] [place]        Today's Diagnoses     Right foot pain    -  1       Follow-ups after your visit        Who to contact     If you have questions or need follow up information about today's clinic visit or your schedule please contact [facility] [place] directly at [phone].  Normal or non-critical lab and imaging results will be communicated to you by [employer], letter or phone within 4 business days after the clinic has received the results. If you do not hear from us within 7 days, please contact the clinic through [employer] or phone. If you have a critical or abnormal lab result, we will notify you by phone as soon as possible.  Submit refill requests through [employer] or call your pharmacy and they will forward the refill request to us. Please allow 3 business days for your refill to be completed.          Additional Information About Your Visit        MyChart Information     [employer] gives you secure access to your electronic health record. If you see a primary care provider, you can also send messages to your care team and make appointments. If you have questions, please call your primary care clinic.  If you do not have a primary care provider, please call [phone] and they will assist you.        Care EveryWhere ID     This is your Care EveryWhere ID. This could be used by other organizations to access your [facility] medical records  [id number]        Your Vitals Were     Pulse Temperature Respirations Height Pulse Oximetry BMI (Body Mass Index)    96 97.8  F (36.6  C) (Tympanic) 12 6' 2\" (1.88 m) 99% 23.24 kg/m2       Blood Pressure from Last 3 Encounters:   11/02/17 116/69   09/12/17 115/72   05/01/17 128/68    Weight from Last 3 " Encounters:   11/02/17 181 lb (82.1 kg)   09/12/17 180 lb 9.6 oz (81.9 kg)   05/01/17 175 lb (79.4 kg)              Today, you had the following     No orders found for display       Primary Care Provider Office Phone # Fax #    Jem RATNA Dee [phone]       [address] [place] [id number]        Equal Access to Services     NAN GAGE : Hadii aad ku hadasho Soomaali, waaxda luqadaha, qaybta kaalmada adeegyada, waxay idiin hayaan adeeg preethi latrue ah. So [facility] [phone].    ATENCIÓN: Si habla español, tiene a moore disposición servicios gratuitos de asistencia lingüística. Llame al [phone].    We comply with applicable federal civil rights laws and [place] laws. We do not discriminate on the basis of race, color, national origin, age, disability, sex, sexual orientation, or gender identity.            Thank you!     Thank you for [place] [facility] [place] your [place]. Our goal is always to provide you with excellent care. Hearing back from our patients is one way we can continue to improve our services. Please take a few minutes to complete the written survey that you may receive in the mail after your visit with us. Thank you!             Your Updated Medication List - Protect others around you: Learn how to safely use, store and throw away your medicines at www.disposemymeds.org.          This list is accurate as of: 11/2/17  2:49 PM.  Always use your most recent med list.                   Brand Name Dispense Instructions for use Diagnosis    aspirin 81 MG EC tablet      Take 81 mg by mouth daily.        LIPITOR 10 MG tablet   Generic drug:  atorvastatin      Take 10 mg by mouth daily.        MULTIVITAMIN PO      Take  by mouth.        omega-3 acid ethyl esters 1 G capsule    Lovaza     Take 2 g by mouth 2 times daily

## 2017-11-02 NOTE — NURSING NOTE
"Chief Complaint   Patient presents with     Musculoskeletal Problem       Initial /69 (Cuff Size: Adult Large)  Pulse 96  Temp 97.8  F (36.6  C) (Tympanic)  Resp 12  Ht 6' 2\" (1.88 m)  Wt 181 lb (82.1 kg)  SpO2 99%  BMI 23.24 kg/m2 Estimated body mass index is 23.24 kg/(m^2) as calculated from the following:    Height as of this encounter: 6' 2\" (1.88 m).    Weight as of this encounter: 181 lb (82.1 kg).  Medication Reconciliation: complete   Socorro Merino, CMA    "

## 2017-11-02 NOTE — PROGRESS NOTES
SUBJECTIVE:   Ad Bryan is a 72 year old male who presents to clinic today for the following health issues:      Musculoskeletal problem/pain      Duration: 3 months     Description  Location:     Intensity:  moderate    Accompanying signs and symptoms: swelling on and off and bruising     History  Previous similar problem: no   Previous evaluation:  none    Precipitating or alleviating factors:  Trauma or overuse: YES- dropped a pan lid on left foot while doing dishes     Aggravating factors include: walking     Therapies tried and outcome: nothing      Problem list and histories reviewed & adjusted, as indicated.  Additional history: as documented    Patient Active Problem List   Diagnosis     Pain in joint, shoulder region     HYPERLIPIDEMIA LDL GOAL <130     S/P rotator cuff repair     Incomplete tear of right rotator cuff     Past Surgical History:   Procedure Laterality Date     COLONOSCOPY  2007    [facility]     HC REPAIR SLIDING INGUINAL HERNIA       HEMORRHOIDECTOMY       ROTATOR CUFF REPAIR RT/LT Right 03/2017    in [place]       Social History   Substance Use Topics     Smoking status: Never Smoker     Smokeless tobacco: Never Used     Alcohol use Yes      Comment: social     Family History   Problem Relation Age of Onset     Respiratory Mother      Hypertension Father      Breast Cancer Sister      Breast Cancer Sister      Breast Cancer Sister          Current Outpatient Prescriptions   Medication Sig Dispense Refill     omega-3 acid ethyl esters (LOVAZA) 1 G capsule Take 2 g by mouth 2 times daily       atorvastatin (LIPITOR) 10 MG tablet Take 10 mg by mouth daily.       Multiple Vitamin (MULTIVITAMIN OR) Take  by mouth.       aspirin 81 MG EC tablet Take 81 mg by mouth daily.       Allergies   Allergen Reactions     Simvastatin      Muscle ache     Recent Labs   Lab Test  09/16/11   0823  09/29/09   0952   LDL  155*  104   HDL  46  46   TRIG  78  72   ALT  26  32   CR   --   1.21   GFRESTIMATED   --  "  60*   GFRESTBLACK   --   73   POTASSIUM   --   4.4      BP Readings from Last 3 Encounters:   11/02/17 116/69   09/12/17 115/72   05/01/17 128/68    Wt Readings from Last 3 Encounters:   11/02/17 181 lb (82.1 kg)   09/12/17 180 lb 9.6 oz (81.9 kg)   05/01/17 175 lb (79.4 kg)              Reviewed and updated as needed this visit by clinical staff     Reviewed and updated as needed this visit by Provider         ROS:  Constitutional, HEENT, cardiovascular, pulmonary, gi and gu systems are negative, except as otherwise noted.      OBJECTIVE:   /69 (Cuff Size: Adult Large)  Pulse 96  Temp 97.8  F (36.6  C) (Tympanic)  Resp 12  Ht 6' 2\" (1.88 m)  Wt 181 lb (82.1 kg)  SpO2 99%  BMI 23.24 kg/m2  Body mass index is 23.24 kg/(m^2).  GENERAL: healthy, alert and no distress  NECK: no adenopathy, no asymmetry, masses, or scars and thyroid normal to palpation  RESP: lungs clear to auscultation - no rales, rhonchi or wheezes  CV: regular rate and rhythm, normal S1 S2, no S3 or S4, no murmur, click or rub, no peripheral edema and peripheral pulses strong  ABDOMEN: soft, nontender, no hepatosplenomegaly, no masses and bowel sounds normal  MS: no gross musculoskeletal defects noted, no edema        ASSESSMENT/PLAN:     Ad was seen today for musculoskeletal problem.    Diagnoses and all orders for this visit:    Right foot pain    Other orders  -     Cancel: Hepatitis C Screen Reflex to HCV RNA Quant and Genotype  -     Cancel: Lipid panel reflex to direct LDL Fasting      Has engorged blood vessels with bleb pattern, not tender, normal capillary filling and normal pulse,   Encouraged him to keep watching sx, and if not improving, will have him to see skin care for further evaluation and possible biopsy        Jem Dee MD  [facility] [place]  "

## 2017-12-13 ENCOUNTER — OFFICE VISIT (OUTPATIENT)
Dept: FAMILY MEDICINE | Facility: CLINIC | Age: 72
End: 2017-12-13
Payer: MEDICARE

## 2017-12-13 VITALS
HEIGHT: 74 IN | OXYGEN SATURATION: 98 % | RESPIRATION RATE: 16 BRPM | TEMPERATURE: 98.7 F | BODY MASS INDEX: 23.23 KG/M2 | HEART RATE: 64 BPM | DIASTOLIC BLOOD PRESSURE: 70 MMHG | SYSTOLIC BLOOD PRESSURE: 116 MMHG | WEIGHT: 181 LBS

## 2017-12-13 DIAGNOSIS — Z23 NEED FOR PROPHYLACTIC VACCINATION AND INOCULATION AGAINST INFLUENZA: ICD-10-CM

## 2017-12-13 DIAGNOSIS — Z23 NEED FOR PROPHYLACTIC VACCINATION AGAINST STREPTOCOCCUS PNEUMONIAE (PNEUMOCOCCUS): ICD-10-CM

## 2017-12-13 DIAGNOSIS — R22.42 FOOT MASS, LEFT: Primary | ICD-10-CM

## 2017-12-13 DIAGNOSIS — Z11.59 NEED FOR HEPATITIS C SCREENING TEST: ICD-10-CM

## 2017-12-13 PROCEDURE — 99213 OFFICE O/P EST LOW 20 MIN: CPT | Performed by: FAMILY MEDICINE

## 2017-12-13 RX ORDER — BRIMONIDINE TARTRATE 1.5 MG/ML
SOLUTION/ DROPS OPHTHALMIC
Refills: 3 | COMMUNITY
Start: 2017-11-02 | End: 2021-01-01

## 2017-12-13 RX ORDER — TAMSULOSIN HYDROCHLORIDE 0.4 MG/1
0.4 CAPSULE ORAL DAILY
COMMUNITY
End: 2021-01-01

## 2017-12-13 NOTE — NURSING NOTE
"Chief Complaint   Patient presents with     Musculoskeletal Problem     Left foot       Initial /70 (Cuff Size: Adult Regular)  Pulse 64  Temp 98.7  F (37.1  C) (Tympanic)  Resp 16  Ht 6' 2\" (1.88 m)  Wt 181 lb (82.1 kg)  SpO2 98%  BMI 23.24 kg/m2 Estimated body mass index is 23.24 kg/(m^2) as calculated from the following:    Height as of this encounter: 6' 2\" (1.88 m).    Weight as of this encounter: 181 lb (82.1 kg).  Medication Reconciliation: complete   Socorro Merino, CMA    "

## 2017-12-13 NOTE — PROGRESS NOTES
SUBJECTIVE:   Ad Bryan is a 72 year old male who presents to clinic today for the following health issues:      Left Foot Follow Up      Description (location/character/radiation): Pt seen on 11/2/17. Left foot not better.          Problem list and histories reviewed & adjusted, as indicated.  Additional history: as documented    Patient Active Problem List   Diagnosis     Pain in joint, shoulder region     HYPERLIPIDEMIA LDL GOAL <130     S/P rotator cuff repair     Incomplete tear of right rotator cuff     Past Surgical History:   Procedure Laterality Date     COLONOSCOPY  2007    [facility]     HC REPAIR SLIDING INGUINAL HERNIA       HEMORRHOIDECTOMY       ROTATOR CUFF REPAIR RT/LT Right 03/2017    in [place]       Social History   Substance Use Topics     Smoking status: Never Smoker     Smokeless tobacco: Never Used     Alcohol use Yes      Comment: social     Family History   Problem Relation Age of Onset     Respiratory Mother      Hypertension Father      Breast Cancer Sister      Breast Cancer Sister      Breast Cancer Sister          Current Outpatient Prescriptions   Medication Sig Dispense Refill     brimonidine (ALPHAGAN-P) 0.15 % ophthalmic solution INT 1 GTT INTO OU BID  3     tamsulosin (FLOMAX) 0.4 MG capsule Take 0.4 mg by mouth daily       Probiotic Product (PROBIOTIC PO) Take by mouth daily       omega-3 acid ethyl esters (LOVAZA) 1 G capsule Take 2 g by mouth 2 times daily       atorvastatin (LIPITOR) 10 MG tablet Take 10 mg by mouth daily.       Multiple Vitamin (MULTIVITAMIN OR) Take  by mouth.       aspirin 81 MG EC tablet Take 81 mg by mouth daily.       Allergies   Allergen Reactions     Simvastatin      Muscle ache     Recent Labs   Lab Test  09/16/11   0823  09/29/09   0952   LDL  155*  104   HDL  46  46   TRIG  78  72   ALT  26  32   CR   --   1.21   GFRESTIMATED   --   60*   GFRESTBLACK   --   73   POTASSIUM   --   4.4      BP Readings from Last 3 Encounters:   12/13/17 116/70  "  11/02/17 116/69   09/12/17 115/72    Wt Readings from Last 3 Encounters:   12/13/17 181 lb (82.1 kg)   11/02/17 181 lb (82.1 kg)   09/12/17 180 lb 9.6 oz (81.9 kg)                          Reviewed and updated as needed this visit by clinical staff     Reviewed and updated as needed this visit by Provider         ROS:  Constitutional, HEENT, cardiovascular, pulmonary, gi and gu systems are negative, except as otherwise noted.      OBJECTIVE:   /70 (Cuff Size: Adult Regular)  Pulse 64  Temp 98.7  F (37.1  C) (Tympanic)  Resp 16  Ht 6' 2\" (1.88 m)  Wt 181 lb (82.1 kg)  SpO2 98%  BMI 23.24 kg/m2  Body mass index is 23.24 kg/(m^2).  GENERAL: healthy, alert and no distress  NECK: no adenopathy, no asymmetry, masses, or scars and thyroid normal to palpation  RESP: lungs clear to auscultation - no rales, rhonchi or wheezes  CV: regular rate and rhythm, normal S1 S2, no S3 or S4, no murmur, click or rub, no peripheral edema and peripheral pulses strong  ABDOMEN: soft, nontender, no hepatosplenomegaly, no masses and bowel sounds normal  MS: mild tenderness on left foot cystic discolored mass on 2nd MTP joint area, has no sign of infection, has mild swelling on 1st MTP joint with tenderness, FROM with pain, has postinflamatory hyperpigmentation         ASSESSMENT/PLAN:     Ad was seen today for musculoskeletal problem.    Diagnoses and all orders for this visit:    Foot mass, left  -     US Extremity Non Vascular Bilateral; Future    Need for hepatitis C screening test    Need for prophylactic vaccination and inoculation against influenza    Need for prophylactic vaccination against Streptococcus pneumoniae (pneumococcus)    Other orders  -     Cancel: Hepatitis C Screen Reflex to HCV RNA Quant and Genotype  -     Cancel: Lipid panel reflex to direct LDL Fasting      Since his sx not improving, will have him to check US on the foot to rule out cystic mass         Jem Dee MD  [facility]

## 2017-12-14 ENCOUNTER — TRANSFERRED RECORDS (OUTPATIENT)
Dept: HEALTH INFORMATION MANAGEMENT | Facility: CLINIC | Age: 72
End: 2017-12-14

## 2017-12-15 ENCOUNTER — TELEPHONE (OUTPATIENT)
Dept: FAMILY MEDICINE | Facility: CLINIC | Age: 72
End: 2017-12-15

## 2017-12-18 NOTE — TELEPHONE ENCOUNTER
Non-detailed message left to return our call.  Nicole Rojas RN - Triage  [facility]

## 2017-12-18 NOTE — TELEPHONE ENCOUNTER
US showed tiny little amount of fluid around the joint, but no other sign of joint capsule damage nor sign of infection. It seems reactive fluid from tendon and deep tissue injury associated with his previous contusion on the foot.   He may not need further test for now. However, if the sx not improving within next 1 months, will consider MR on the foot for further evaluation   Please let him know  thx

## 2017-12-19 NOTE — TELEPHONE ENCOUNTER
Patient notified of test results and PCP's message, no further questions.  patient is in [place] and will come back to [place] in 6 weeks- he will call in one month with an update    Tess Jeronimo RN  [facility]  [phone]

## 2019-10-03 ENCOUNTER — HEALTH MAINTENANCE LETTER (OUTPATIENT)
Age: 74
End: 2019-10-03

## 2020-02-08 ENCOUNTER — HEALTH MAINTENANCE LETTER (OUTPATIENT)
Age: 75
End: 2020-02-08

## 2020-11-07 ENCOUNTER — HEALTH MAINTENANCE LETTER (OUTPATIENT)
Age: 75
End: 2020-11-07

## 2021-01-01 ENCOUNTER — HOSPITAL ENCOUNTER (INPATIENT)
Facility: CLINIC | Age: 76
LOS: 3 days | Discharge: HOME | DRG: 885 | End: 2021-10-28
Attending: EMERGENCY MEDICINE | Admitting: HOSPITALIST
Payer: MEDICARE

## 2021-01-01 ENCOUNTER — HEALTH MAINTENANCE LETTER (OUTPATIENT)
Age: 76
End: 2021-01-01

## 2021-01-01 ENCOUNTER — OFFICE VISIT (OUTPATIENT)
Dept: FAMILY MEDICINE | Facility: CLINIC | Age: 76
End: 2021-01-01
Payer: MEDICARE

## 2021-01-01 ENCOUNTER — TRANSFERRED RECORDS (OUTPATIENT)
Dept: HEALTH INFORMATION MANAGEMENT | Facility: CLINIC | Age: 76
End: 2021-01-01
Payer: MEDICARE

## 2021-01-01 ENCOUNTER — PATIENT OUTREACH (OUTPATIENT)
Dept: FAMILY MEDICINE | Facility: CLINIC | Age: 76
End: 2021-01-01

## 2021-01-01 ENCOUNTER — TELEPHONE (OUTPATIENT)
Dept: FAMILY MEDICINE | Facility: CLINIC | Age: 76
End: 2021-01-01
Payer: MEDICARE

## 2021-01-01 ENCOUNTER — LAB (OUTPATIENT)
Dept: LAB | Facility: CLINIC | Age: 76
End: 2021-01-01
Payer: MEDICARE

## 2021-01-01 ENCOUNTER — APPOINTMENT (OUTPATIENT)
Dept: CT IMAGING | Facility: CLINIC | Age: 76
DRG: 885 | End: 2021-01-01
Attending: NURSE PRACTITIONER
Payer: MEDICARE

## 2021-01-01 ENCOUNTER — TELEPHONE (OUTPATIENT)
Dept: BEHAVIORAL HEALTH | Facility: CLINIC | Age: 76
End: 2021-01-01

## 2021-01-01 ENCOUNTER — APPOINTMENT (OUTPATIENT)
Dept: CT IMAGING | Facility: CLINIC | Age: 76
DRG: 885 | End: 2021-01-01
Attending: EMERGENCY MEDICINE
Payer: MEDICARE

## 2021-01-01 ENCOUNTER — APPOINTMENT (OUTPATIENT)
Dept: ULTRASOUND IMAGING | Facility: CLINIC | Age: 76
DRG: 885 | End: 2021-01-01
Attending: INTERNAL MEDICINE
Payer: MEDICARE

## 2021-01-01 VITALS
HEIGHT: 72 IN | RESPIRATION RATE: 16 BRPM | WEIGHT: 157 LBS | OXYGEN SATURATION: 100 % | TEMPERATURE: 97.2 F | SYSTOLIC BLOOD PRESSURE: 120 MMHG | BODY MASS INDEX: 21.26 KG/M2 | HEART RATE: 83 BPM | DIASTOLIC BLOOD PRESSURE: 80 MMHG

## 2021-01-01 VITALS
SYSTOLIC BLOOD PRESSURE: 116 MMHG | HEART RATE: 80 BPM | RESPIRATION RATE: 18 BRPM | OXYGEN SATURATION: 99 % | HEIGHT: 72 IN | DIASTOLIC BLOOD PRESSURE: 70 MMHG | WEIGHT: 151.9 LBS | TEMPERATURE: 98.2 F | BODY MASS INDEX: 20.57 KG/M2

## 2021-01-01 DIAGNOSIS — N40.1 BENIGN PROSTATIC HYPERPLASIA WITH URINARY OBSTRUCTION: ICD-10-CM

## 2021-01-01 DIAGNOSIS — K59.01 SLOW TRANSIT CONSTIPATION: ICD-10-CM

## 2021-01-01 DIAGNOSIS — K59.00 CONSTIPATION, UNSPECIFIED CONSTIPATION TYPE: ICD-10-CM

## 2021-01-01 DIAGNOSIS — N13.8 HYPERPLASIA OF PROSTATE WITH URINARY OBSTRUCTION: ICD-10-CM

## 2021-01-01 DIAGNOSIS — R41.89 COGNITIVE IMPAIRMENT: ICD-10-CM

## 2021-01-01 DIAGNOSIS — N40.0 BPH (BENIGN PROSTATIC HYPERPLASIA): Primary | ICD-10-CM

## 2021-01-01 DIAGNOSIS — R73.01 IFG (IMPAIRED FASTING GLUCOSE): ICD-10-CM

## 2021-01-01 DIAGNOSIS — F32.3 PSYCHOTIC DEPRESSION (H): Primary | ICD-10-CM

## 2021-01-01 DIAGNOSIS — N13.8 HYPERTROPHY OF PROSTATE WITH URINARY OBSTRUCTION: Primary | ICD-10-CM

## 2021-01-01 DIAGNOSIS — F32.9 MAJOR DEPRESSION: ICD-10-CM

## 2021-01-01 DIAGNOSIS — N40.1 HYPERTROPHY OF PROSTATE WITH URINARY OBSTRUCTION: Primary | ICD-10-CM

## 2021-01-01 DIAGNOSIS — I82.462 ACUTE DEEP VEIN THROMBOSIS (DVT) OF CALF MUSCLE VEIN OF LEFT LOWER EXTREMITY (H): ICD-10-CM

## 2021-01-01 DIAGNOSIS — E78.5 DYSLIPIDEMIA: ICD-10-CM

## 2021-01-01 DIAGNOSIS — Z11.59 NEED FOR HEPATITIS C SCREENING TEST: ICD-10-CM

## 2021-01-01 DIAGNOSIS — N13.8 BENIGN PROSTATIC HYPERPLASIA WITH URINARY OBSTRUCTION: ICD-10-CM

## 2021-01-01 DIAGNOSIS — R06.02 SHORTNESS OF BREATH: ICD-10-CM

## 2021-01-01 DIAGNOSIS — F32.3 PSYCHOTIC DEPRESSION (H): ICD-10-CM

## 2021-01-01 DIAGNOSIS — N40.1 HYPERPLASIA OF PROSTATE WITH URINARY OBSTRUCTION: ICD-10-CM

## 2021-01-01 DIAGNOSIS — R45.851 SUICIDAL IDEATION: ICD-10-CM

## 2021-01-01 DIAGNOSIS — R33.9 URINARY RETENTION: ICD-10-CM

## 2021-01-01 DIAGNOSIS — N18.31 STAGE 3A CHRONIC KIDNEY DISEASE (H): ICD-10-CM

## 2021-01-01 DIAGNOSIS — R41.0 CONFUSION: Primary | ICD-10-CM

## 2021-01-01 LAB
ALBUMIN SERPL-MCNC: 3.9 G/DL (ref 3.4–5)
ALBUMIN UR-MCNC: 10 MG/DL
ALBUMIN UR-MCNC: NEGATIVE MG/DL
ALBUMIN UR-MCNC: NEGATIVE MG/DL
ALP SERPL-CCNC: 47 U/L (ref 40–150)
ALT SERPL W P-5'-P-CCNC: 32 U/L (ref 0–70)
AMORPH CRY #/AREA URNS HPF: ABNORMAL /HPF
ANION GAP SERPL CALCULATED.3IONS-SCNC: 5 MMOL/L (ref 3–14)
ANION GAP SERPL CALCULATED.3IONS-SCNC: 5 MMOL/L (ref 3–14)
ANION GAP SERPL CALCULATED.3IONS-SCNC: 7 MMOL/L (ref 3–14)
APPEARANCE UR: ABNORMAL
APPEARANCE UR: CLEAR
APPEARANCE UR: CLEAR
AST SERPL W P-5'-P-CCNC: 28 U/L (ref 0–45)
ATRIAL RATE - MUSE: 66 BPM
BACTERIA #/AREA URNS HPF: ABNORMAL /HPF
BACTERIA UR CULT: ABNORMAL
BACTERIA UR CULT: NO GROWTH
BASOPHILS # BLD AUTO: 0 10E3/UL (ref 0–0.2)
BASOPHILS # BLD AUTO: 0 10E3/UL (ref 0–0.2)
BASOPHILS NFR BLD AUTO: 1 %
BASOPHILS NFR BLD AUTO: 1 %
BILIRUB DIRECT SERPL-MCNC: 0.2 MG/DL (ref 0–0.2)
BILIRUB SERPL-MCNC: 0.9 MG/DL (ref 0.2–1.3)
BILIRUB UR QL STRIP: NEGATIVE
BUN SERPL-MCNC: 18 MG/DL (ref 7–30)
BUN SERPL-MCNC: 19 MG/DL (ref 7–30)
BUN SERPL-MCNC: 25 MG/DL (ref 7–30)
CALCIUM SERPL-MCNC: 9 MG/DL (ref 8.5–10.1)
CALCIUM SERPL-MCNC: 9 MG/DL (ref 8.5–10.1)
CALCIUM SERPL-MCNC: 9.1 MG/DL (ref 8.5–10.1)
CHLORIDE BLD-SCNC: 107 MMOL/L (ref 94–109)
CHLORIDE BLD-SCNC: 108 MMOL/L (ref 94–109)
CHLORIDE BLD-SCNC: 112 MMOL/L (ref 94–109)
CO2 SERPL-SCNC: 25 MMOL/L (ref 20–32)
CO2 SERPL-SCNC: 26 MMOL/L (ref 20–32)
CO2 SERPL-SCNC: 27 MMOL/L (ref 20–32)
COLOR UR AUTO: NORMAL
COLOR UR AUTO: YELLOW
COLOR UR AUTO: YELLOW
CREAT SERPL-MCNC: 1.02 MG/DL (ref 0.66–1.25)
CREAT SERPL-MCNC: 1.08 MG/DL (ref 0.66–1.25)
CREAT SERPL-MCNC: 1.27 MG/DL (ref 0.66–1.25)
D DIMER PPP FEU-MCNC: 0.56 UG/ML FEU (ref 0–0.5)
DIASTOLIC BLOOD PRESSURE - MUSE: NORMAL MMHG
EOSINOPHIL # BLD AUTO: 0 10E3/UL (ref 0–0.7)
EOSINOPHIL # BLD AUTO: 0.1 10E3/UL (ref 0–0.7)
EOSINOPHIL NFR BLD AUTO: 1 %
EOSINOPHIL NFR BLD AUTO: 1 %
ERYTHROCYTE [DISTWIDTH] IN BLOOD BY AUTOMATED COUNT: 12.7 % (ref 10–15)
ERYTHROCYTE [DISTWIDTH] IN BLOOD BY AUTOMATED COUNT: 12.8 % (ref 10–15)
ERYTHROCYTE [DISTWIDTH] IN BLOOD BY AUTOMATED COUNT: 13.1 % (ref 10–15)
GFR SERPL CREATININE-BSD FRML MDRD: 55 ML/MIN/1.73M2
GFR SERPL CREATININE-BSD FRML MDRD: 66 ML/MIN/1.73M2
GFR SERPL CREATININE-BSD FRML MDRD: 71 ML/MIN/1.73M2
GLUCOSE BLD-MCNC: 100 MG/DL (ref 70–99)
GLUCOSE BLD-MCNC: 103 MG/DL (ref 70–99)
GLUCOSE BLD-MCNC: 93 MG/DL (ref 70–99)
GLUCOSE UR STRIP-MCNC: NEGATIVE MG/DL
HCT VFR BLD AUTO: 35.4 % (ref 40–53)
HCT VFR BLD AUTO: 39.1 % (ref 40–53)
HCT VFR BLD AUTO: 39.6 % (ref 40–53)
HGB BLD-MCNC: 11.8 G/DL (ref 13.3–17.7)
HGB BLD-MCNC: 12.9 G/DL (ref 13.3–17.7)
HGB BLD-MCNC: 13.1 G/DL (ref 13.3–17.7)
HGB UR QL STRIP: NEGATIVE
IMM GRANULOCYTES # BLD: 0 10E3/UL
IMM GRANULOCYTES # BLD: 0 10E3/UL
IMM GRANULOCYTES NFR BLD: 0 %
IMM GRANULOCYTES NFR BLD: 0 %
INTERPRETATION ECG - MUSE: NORMAL
KETONES UR STRIP-MCNC: 10 MG/DL
KETONES UR STRIP-MCNC: NEGATIVE MG/DL
KETONES UR STRIP-MCNC: NEGATIVE MG/DL
LEUKOCYTE ESTERASE UR QL STRIP: ABNORMAL
LEUKOCYTE ESTERASE UR QL STRIP: NEGATIVE
LEUKOCYTE ESTERASE UR QL STRIP: NEGATIVE
LYMPHOCYTES # BLD AUTO: 0.6 10E3/UL (ref 0.8–5.3)
LYMPHOCYTES # BLD AUTO: 0.8 10E3/UL (ref 0.8–5.3)
LYMPHOCYTES NFR BLD AUTO: 15 %
LYMPHOCYTES NFR BLD AUTO: 23 %
MAGNESIUM SERPL-MCNC: 2.2 MG/DL (ref 1.6–2.3)
MAGNESIUM SERPL-MCNC: 2.9 MG/DL (ref 1.6–2.3)
MCH RBC QN AUTO: 32.1 PG (ref 26.5–33)
MCH RBC QN AUTO: 32.1 PG (ref 26.5–33)
MCH RBC QN AUTO: 32.2 PG (ref 26.5–33)
MCHC RBC AUTO-ENTMCNC: 32.6 G/DL (ref 31.5–36.5)
MCHC RBC AUTO-ENTMCNC: 33.3 G/DL (ref 31.5–36.5)
MCHC RBC AUTO-ENTMCNC: 33.5 G/DL (ref 31.5–36.5)
MCV RBC AUTO: 96 FL (ref 78–100)
MCV RBC AUTO: 96 FL (ref 78–100)
MCV RBC AUTO: 99 FL (ref 78–100)
MONOCYTES # BLD AUTO: 0.4 10E3/UL (ref 0–1.3)
MONOCYTES # BLD AUTO: 0.4 10E3/UL (ref 0–1.3)
MONOCYTES NFR BLD AUTO: 10 %
MONOCYTES NFR BLD AUTO: 11 %
MUCOUS THREADS #/AREA URNS LPF: PRESENT /LPF
MUCOUS THREADS #/AREA URNS LPF: PRESENT /LPF
NEUTROPHILS # BLD AUTO: 2.3 10E3/UL (ref 1.6–8.3)
NEUTROPHILS # BLD AUTO: 3.1 10E3/UL (ref 1.6–8.3)
NEUTROPHILS NFR BLD AUTO: 64 %
NEUTROPHILS NFR BLD AUTO: 73 %
NITRATE UR QL: NEGATIVE
NITRATE UR QL: NEGATIVE
NITRATE UR QL: POSITIVE
NRBC # BLD AUTO: 0 10E3/UL
NRBC # BLD AUTO: 0 10E3/UL
NRBC BLD AUTO-RTO: 0 /100
NRBC BLD AUTO-RTO: 0 /100
P AXIS - MUSE: 44 DEGREES
PH UR STRIP: 6 [PH] (ref 5–7)
PH UR STRIP: 6.5 [PH] (ref 5–7)
PH UR STRIP: 7 [PH] (ref 5–7)
PHOSPHATE SERPL-MCNC: 3.5 MG/DL (ref 2.5–4.5)
PLATELET # BLD AUTO: 172 10E3/UL (ref 150–450)
PLATELET # BLD AUTO: 184 10E3/UL (ref 150–450)
PLATELET # BLD AUTO: 196 10E3/UL (ref 150–450)
POTASSIUM BLD-SCNC: 3.8 MMOL/L (ref 3.4–5.3)
POTASSIUM BLD-SCNC: 3.9 MMOL/L (ref 3.4–5.3)
POTASSIUM BLD-SCNC: 4.3 MMOL/L (ref 3.4–5.3)
PR INTERVAL - MUSE: 112 MS
PROT SERPL-MCNC: 7.7 G/DL (ref 6.8–8.8)
QRS DURATION - MUSE: 92 MS
QT - MUSE: 374 MS
QTC - MUSE: 392 MS
R AXIS - MUSE: -41 DEGREES
RBC # BLD AUTO: 3.68 10E6/UL (ref 4.4–5.9)
RBC # BLD AUTO: 4.01 10E6/UL (ref 4.4–5.9)
RBC # BLD AUTO: 4.08 10E6/UL (ref 4.4–5.9)
RBC #/AREA URNS AUTO: ABNORMAL /HPF
RBC URINE: 1 /HPF
SARS-COV-2 RNA RESP QL NAA+PROBE: NEGATIVE
SODIUM SERPL-SCNC: 139 MMOL/L (ref 133–144)
SODIUM SERPL-SCNC: 140 MMOL/L (ref 133–144)
SODIUM SERPL-SCNC: 143 MMOL/L (ref 133–144)
SP GR UR STRIP: 1.02 (ref 1–1.03)
SP GR UR STRIP: 1.02 (ref 1–1.03)
SP GR UR STRIP: 1.03 (ref 1–1.03)
SPERM #/AREA URNS HPF: PRESENT /HPF
SYSTOLIC BLOOD PRESSURE - MUSE: NORMAL MMHG
T AXIS - MUSE: 19 DEGREES
TROPONIN I SERPL-MCNC: <0.015 UG/L (ref 0–0.04)
TSH SERPL DL<=0.005 MIU/L-ACNC: 1.28 MU/L (ref 0.4–4)
UROBILINOGEN UR STRIP-ACNC: 0.2 E.U./DL
UROBILINOGEN UR STRIP-MCNC: NORMAL MG/DL
UROBILINOGEN UR STRIP-MCNC: NORMAL MG/DL
VENTRICULAR RATE- MUSE: 66 BPM
VIT B1 PYROPHOSHATE BLD-SCNC: 96 NMOL/L
VIT B12 SERPL-MCNC: 755 PG/ML (ref 193–986)
WBC # BLD AUTO: 3.6 10E3/UL (ref 4–11)
WBC # BLD AUTO: 4.2 10E3/UL (ref 4–11)
WBC # BLD AUTO: 4.5 10E3/UL (ref 4–11)
WBC #/AREA URNS AUTO: ABNORMAL /HPF
WBC URINE: 1 /HPF

## 2021-01-01 PROCEDURE — 85025 COMPLETE CBC W/AUTO DIFF WBC: CPT | Performed by: EMERGENCY MEDICINE

## 2021-01-01 PROCEDURE — 210N000002 HC R&B HEART CARE

## 2021-01-01 PROCEDURE — 250N000013 HC RX MED GY IP 250 OP 250 PS 637: Performed by: INTERNAL MEDICINE

## 2021-01-01 PROCEDURE — 85379 FIBRIN DEGRADATION QUANT: CPT | Performed by: EMERGENCY MEDICINE

## 2021-01-01 PROCEDURE — 36415 COLL VENOUS BLD VENIPUNCTURE: CPT | Performed by: INTERNAL MEDICINE

## 2021-01-01 PROCEDURE — 84443 ASSAY THYROID STIM HORMONE: CPT | Performed by: EMERGENCY MEDICINE

## 2021-01-01 PROCEDURE — 36415 COLL VENOUS BLD VENIPUNCTURE: CPT | Performed by: EMERGENCY MEDICINE

## 2021-01-01 PROCEDURE — 250N000011 HC RX IP 250 OP 636: Performed by: HOSPITALIST

## 2021-01-01 PROCEDURE — 250N000013 HC RX MED GY IP 250 OP 250 PS 637: Performed by: HOSPITALIST

## 2021-01-01 PROCEDURE — 81001 URINALYSIS AUTO W/SCOPE: CPT

## 2021-01-01 PROCEDURE — G0378 HOSPITAL OBSERVATION PER HR: HCPCS

## 2021-01-01 PROCEDURE — 96361 HYDRATE IV INFUSION ADD-ON: CPT

## 2021-01-01 PROCEDURE — 80048 BASIC METABOLIC PNL TOTAL CA: CPT | Performed by: HOSPITALIST

## 2021-01-01 PROCEDURE — 83735 ASSAY OF MAGNESIUM: CPT | Performed by: NURSE PRACTITIONER

## 2021-01-01 PROCEDURE — 120N000001 HC R&B MED SURG/OB

## 2021-01-01 PROCEDURE — 250N000011 HC RX IP 250 OP 636: Performed by: INTERNAL MEDICINE

## 2021-01-01 PROCEDURE — 96360 HYDRATION IV INFUSION INIT: CPT | Mod: 59

## 2021-01-01 PROCEDURE — 82040 ASSAY OF SERUM ALBUMIN: CPT | Performed by: HOSPITALIST

## 2021-01-01 PROCEDURE — 71275 CT ANGIOGRAPHY CHEST: CPT | Mod: ME

## 2021-01-01 PROCEDURE — 250N000013 HC RX MED GY IP 250 OP 250 PS 637: Performed by: NURSE PRACTITIONER

## 2021-01-01 PROCEDURE — 81003 URINALYSIS AUTO W/O SCOPE: CPT | Performed by: EMERGENCY MEDICINE

## 2021-01-01 PROCEDURE — 85027 COMPLETE CBC AUTOMATED: CPT | Performed by: HOSPITALIST

## 2021-01-01 PROCEDURE — 83735 ASSAY OF MAGNESIUM: CPT | Performed by: HOSPITALIST

## 2021-01-01 PROCEDURE — 80048 BASIC METABOLIC PNL TOTAL CA: CPT | Performed by: EMERGENCY MEDICINE

## 2021-01-01 PROCEDURE — 87088 URINE BACTERIA CULTURE: CPT

## 2021-01-01 PROCEDURE — 99285 EMERGENCY DEPT VISIT HI MDM: CPT | Mod: 25

## 2021-01-01 PROCEDURE — 87186 SC STD MICRODIL/AGAR DIL: CPT

## 2021-01-01 PROCEDURE — 82607 VITAMIN B-12: CPT | Performed by: INTERNAL MEDICINE

## 2021-01-01 PROCEDURE — 96372 THER/PROPH/DIAG INJ SC/IM: CPT | Performed by: HOSPITALIST

## 2021-01-01 PROCEDURE — 250N000009 HC RX 250: Performed by: EMERGENCY MEDICINE

## 2021-01-01 PROCEDURE — 70450 CT HEAD/BRAIN W/O DYE: CPT | Mod: ME

## 2021-01-01 PROCEDURE — 99205 OFFICE O/P NEW HI 60 MIN: CPT | Performed by: INTERNAL MEDICINE

## 2021-01-01 PROCEDURE — 87086 URINE CULTURE/COLONY COUNT: CPT

## 2021-01-01 PROCEDURE — 99220 PR INITIAL OBSERVATION CARE,LEVEL III: CPT | Performed by: HOSPITALIST

## 2021-01-01 PROCEDURE — 250N000011 HC RX IP 250 OP 636: Performed by: EMERGENCY MEDICINE

## 2021-01-01 PROCEDURE — 99239 HOSP IP/OBS DSCHRG MGMT >30: CPT | Performed by: INTERNAL MEDICINE

## 2021-01-01 PROCEDURE — 90791 PSYCH DIAGNOSTIC EVALUATION: CPT

## 2021-01-01 PROCEDURE — C9803 HOPD COVID-19 SPEC COLLECT: HCPCS

## 2021-01-01 PROCEDURE — 36415 COLL VENOUS BLD VENIPUNCTURE: CPT | Performed by: HOSPITALIST

## 2021-01-01 PROCEDURE — 258N000003 HC RX IP 258 OP 636: Performed by: HOSPITALIST

## 2021-01-01 PROCEDURE — 87635 SARS-COV-2 COVID-19 AMP PRB: CPT | Performed by: EMERGENCY MEDICINE

## 2021-01-01 PROCEDURE — 84425 ASSAY OF VITAMIN B-1: CPT | Performed by: EMERGENCY MEDICINE

## 2021-01-01 PROCEDURE — 99232 SBSQ HOSP IP/OBS MODERATE 35: CPT | Performed by: INTERNAL MEDICINE

## 2021-01-01 PROCEDURE — 93970 EXTREMITY STUDY: CPT

## 2021-01-01 PROCEDURE — 93005 ELECTROCARDIOGRAM TRACING: CPT

## 2021-01-01 PROCEDURE — 99233 SBSQ HOSP IP/OBS HIGH 50: CPT | Performed by: INTERNAL MEDICINE

## 2021-01-01 PROCEDURE — 84100 ASSAY OF PHOSPHORUS: CPT | Performed by: HOSPITALIST

## 2021-01-01 PROCEDURE — 99220 PR INITIAL OBSERVATION CARE,LEVEL III: CPT | Performed by: NURSE PRACTITIONER

## 2021-01-01 PROCEDURE — 84484 ASSAY OF TROPONIN QUANT: CPT | Performed by: EMERGENCY MEDICINE

## 2021-01-01 RX ORDER — MIRTAZAPINE 15 MG/1
15 TABLET, ORALLY DISINTEGRATING ORAL AT BEDTIME
Qty: 30 TABLET | Refills: 1 | Status: SHIPPED | OUTPATIENT
Start: 2021-01-01 | End: 2021-01-01

## 2021-01-01 RX ORDER — TADALAFIL 5 MG/1
5 TABLET ORAL DAILY PRN
COMMUNITY

## 2021-01-01 RX ORDER — ESCITALOPRAM OXALATE 10 MG/1
10 TABLET ORAL DAILY
Status: ON HOLD | COMMUNITY
End: 2021-01-01

## 2021-01-01 RX ORDER — NYSTATIN 100000/ML
500000 SUSPENSION, ORAL (FINAL DOSE FORM) ORAL 4 TIMES DAILY
Status: ON HOLD | COMMUNITY
End: 2021-01-01

## 2021-01-01 RX ORDER — CLONAZEPAM 0.5 MG/1
0.5 TABLET ORAL 3 TIMES DAILY PRN
Status: ON HOLD | COMMUNITY
End: 2021-01-01

## 2021-01-01 RX ORDER — LATANOPROST 50 UG/ML
1 SOLUTION/ DROPS OPHTHALMIC EVERY EVENING
Status: DISCONTINUED | OUTPATIENT
Start: 2021-01-01 | End: 2021-01-01 | Stop reason: HOSPADM

## 2021-01-01 RX ORDER — SENNOSIDES 8.6 MG
2 TABLET ORAL 2 TIMES DAILY
Status: DISCONTINUED | OUTPATIENT
Start: 2021-01-01 | End: 2021-01-01 | Stop reason: HOSPADM

## 2021-01-01 RX ORDER — POLYETHYLENE GLYCOL 3350 17 G/17G
34 POWDER, FOR SOLUTION ORAL ONCE
Status: COMPLETED | OUTPATIENT
Start: 2021-01-01 | End: 2021-01-01

## 2021-01-01 RX ORDER — OLANZAPINE 5 MG/1
5 TABLET, ORALLY DISINTEGRATING ORAL 2 TIMES DAILY
Status: DISCONTINUED | OUTPATIENT
Start: 2021-01-01 | End: 2021-01-01

## 2021-01-01 RX ORDER — MIRTAZAPINE 15 MG/1
15 TABLET, ORALLY DISINTEGRATING ORAL AT BEDTIME
Qty: 30 TABLET | Refills: 1 | Status: SHIPPED | OUTPATIENT
Start: 2021-01-01

## 2021-01-01 RX ORDER — CLONAZEPAM 0.5 MG/1
0.5 TABLET ORAL 2 TIMES DAILY PRN
Status: DISCONTINUED | OUTPATIENT
Start: 2021-01-01 | End: 2021-01-01

## 2021-01-01 RX ORDER — TRAZODONE HYDROCHLORIDE 50 MG/1
50 TABLET, FILM COATED ORAL AT BEDTIME
Status: DISCONTINUED | OUTPATIENT
Start: 2021-01-01 | End: 2021-01-01

## 2021-01-01 RX ORDER — IOPAMIDOL 755 MG/ML
63 INJECTION, SOLUTION INTRAVASCULAR ONCE
Status: COMPLETED | OUTPATIENT
Start: 2021-01-01 | End: 2021-01-01

## 2021-01-01 RX ORDER — CLONAZEPAM 0.5 MG/1
0.5 TABLET ORAL 3 TIMES DAILY PRN
Status: DISCONTINUED | OUTPATIENT
Start: 2021-01-01 | End: 2021-01-01

## 2021-01-01 RX ORDER — OLANZAPINE 5 MG/1
5 TABLET, ORALLY DISINTEGRATING ORAL
Status: DISCONTINUED | OUTPATIENT
Start: 2021-01-01 | End: 2021-01-01 | Stop reason: HOSPADM

## 2021-01-01 RX ORDER — IPRATROPIUM BROMIDE 21 UG/1
2 SPRAY, METERED NASAL 3 TIMES DAILY
Status: DISCONTINUED | OUTPATIENT
Start: 2021-01-01 | End: 2021-01-01 | Stop reason: HOSPADM

## 2021-01-01 RX ORDER — ONDANSETRON 2 MG/ML
4 INJECTION INTRAMUSCULAR; INTRAVENOUS EVERY 6 HOURS PRN
Status: DISCONTINUED | OUTPATIENT
Start: 2021-01-01 | End: 2021-01-01 | Stop reason: HOSPADM

## 2021-01-01 RX ORDER — POLYETHYLENE GLYCOL 3350 17 G/17G
17 POWDER, FOR SOLUTION ORAL DAILY PRN
Status: DISCONTINUED | OUTPATIENT
Start: 2021-01-01 | End: 2021-01-01 | Stop reason: HOSPADM

## 2021-01-01 RX ORDER — ACETAMINOPHEN 650 MG/1
650 SUPPOSITORY RECTAL EVERY 6 HOURS PRN
Status: DISCONTINUED | OUTPATIENT
Start: 2021-01-01 | End: 2021-01-01 | Stop reason: HOSPADM

## 2021-01-01 RX ORDER — TRAZODONE HYDROCHLORIDE 50 MG/1
50 TABLET, FILM COATED ORAL
Status: DISCONTINUED | OUTPATIENT
Start: 2021-01-01 | End: 2021-01-01 | Stop reason: HOSPADM

## 2021-01-01 RX ORDER — MIRTAZAPINE 15 MG/1
15 TABLET, ORALLY DISINTEGRATING ORAL AT BEDTIME
Status: DISCONTINUED | OUTPATIENT
Start: 2021-01-01 | End: 2021-01-01 | Stop reason: HOSPADM

## 2021-01-01 RX ORDER — AMOXICILLIN 250 MG
1 CAPSULE ORAL 2 TIMES DAILY PRN
Status: DISCONTINUED | OUTPATIENT
Start: 2021-01-01 | End: 2021-01-01 | Stop reason: HOSPADM

## 2021-01-01 RX ORDER — CICLOPIROX 80 MG/ML
SOLUTION TOPICAL
COMMUNITY

## 2021-01-01 RX ORDER — ACETAMINOPHEN 325 MG/1
650 TABLET ORAL EVERY 6 HOURS PRN
Status: DISCONTINUED | OUTPATIENT
Start: 2021-01-01 | End: 2021-01-01 | Stop reason: HOSPADM

## 2021-01-01 RX ORDER — LATANOPROST 50 UG/ML
1 SOLUTION/ DROPS OPHTHALMIC DAILY
COMMUNITY

## 2021-01-01 RX ORDER — MULTIPLE VITAMINS W/ MINERALS TAB 9MG-400MCG
1 TAB ORAL DAILY
Status: DISCONTINUED | OUTPATIENT
Start: 2021-01-01 | End: 2021-01-01 | Stop reason: HOSPADM

## 2021-01-01 RX ORDER — AMOXICILLIN 250 MG
2 CAPSULE ORAL 2 TIMES DAILY PRN
Status: DISCONTINUED | OUTPATIENT
Start: 2021-01-01 | End: 2021-01-01 | Stop reason: HOSPADM

## 2021-01-01 RX ORDER — TAMSULOSIN HYDROCHLORIDE 0.4 MG/1
0.8 CAPSULE ORAL DAILY
Qty: 30 CAPSULE | Refills: 1 | Status: SHIPPED | OUTPATIENT
Start: 2021-01-01

## 2021-01-01 RX ORDER — ONDANSETRON 4 MG/1
4 TABLET, ORALLY DISINTEGRATING ORAL EVERY 6 HOURS PRN
Status: DISCONTINUED | OUTPATIENT
Start: 2021-01-01 | End: 2021-01-01 | Stop reason: HOSPADM

## 2021-01-01 RX ORDER — AMOXICILLIN 250 MG
2 CAPSULE ORAL 2 TIMES DAILY
Qty: 100 TABLET | Refills: 1 | Status: SHIPPED | OUTPATIENT
Start: 2021-01-01

## 2021-01-01 RX ORDER — ALFUZOSIN HYDROCHLORIDE 10 MG/1
10 TABLET, EXTENDED RELEASE ORAL DAILY
Status: ON HOLD | COMMUNITY
End: 2021-01-01

## 2021-01-01 RX ORDER — LANOLIN ALCOHOL/MO/W.PET/CERES
3 CREAM (GRAM) TOPICAL
Status: DISCONTINUED | OUTPATIENT
Start: 2021-01-01 | End: 2021-01-01 | Stop reason: HOSPADM

## 2021-01-01 RX ORDER — BISACODYL 10 MG
10 SUPPOSITORY, RECTAL RECTAL ONCE
Status: COMPLETED | OUTPATIENT
Start: 2021-01-01 | End: 2021-01-01

## 2021-01-01 RX ORDER — POLYETHYLENE GLYCOL 3350 17 G/17G
1 POWDER, FOR SOLUTION ORAL DAILY
Qty: 500 G | Refills: 0 | Status: SHIPPED | OUTPATIENT
Start: 2021-01-01

## 2021-01-01 RX ORDER — ALFUZOSIN HYDROCHLORIDE 10 MG/1
10 TABLET, EXTENDED RELEASE ORAL DAILY
Status: DISCONTINUED | OUTPATIENT
Start: 2021-01-01 | End: 2021-01-01

## 2021-01-01 RX ORDER — TAMSULOSIN HYDROCHLORIDE 0.4 MG/1
0.8 CAPSULE ORAL DAILY
Status: DISCONTINUED | OUTPATIENT
Start: 2021-01-01 | End: 2021-01-01 | Stop reason: HOSPADM

## 2021-01-01 RX ORDER — IPRATROPIUM BROMIDE 21 UG/1
2 SPRAY, METERED NASAL 3 TIMES DAILY
COMMUNITY

## 2021-01-01 RX ADMIN — LATANOPROST 1 DROP: 50 SOLUTION/ DROPS OPHTHALMIC at 20:24

## 2021-01-01 RX ADMIN — OLANZAPINE 5 MG: 5 TABLET, ORALLY DISINTEGRATING ORAL at 08:36

## 2021-01-01 RX ADMIN — ENOXAPARIN SODIUM 40 MG: 40 INJECTION SUBCUTANEOUS at 20:54

## 2021-01-01 RX ADMIN — SODIUM CHLORIDE 88 ML: 900 INJECTION INTRAVENOUS at 21:54

## 2021-01-01 RX ADMIN — ENOXAPARIN SODIUM 40 MG: 40 INJECTION SUBCUTANEOUS at 20:41

## 2021-01-01 RX ADMIN — MULTIPLE VITAMINS W/ MINERALS TAB 1 TABLET: TAB at 09:01

## 2021-01-01 RX ADMIN — IOPAMIDOL 63 ML: 755 INJECTION, SOLUTION INTRAVENOUS at 21:54

## 2021-01-01 RX ADMIN — LATANOPROST 1 DROP: 50 SOLUTION/ DROPS OPHTHALMIC at 20:56

## 2021-01-01 RX ADMIN — ALFUZOSIN HYDROCHLORIDE 10 MG: 10 TABLET, EXTENDED RELEASE ORAL at 10:01

## 2021-01-01 RX ADMIN — IPRATROPIUM BROMIDE 2 SPRAY: 21 SPRAY, METERED NASAL at 14:29

## 2021-01-01 RX ADMIN — BRINZOLAMIDE/BRIMONIDINE TARTRATE 1 DROP: 10; 2 SUSPENSION/ DROPS OPHTHALMIC at 09:19

## 2021-01-01 RX ADMIN — MULTIPLE VITAMINS W/ MINERALS TAB 1 TABLET: TAB at 09:19

## 2021-01-01 RX ADMIN — ALFUZOSIN HYDROCHLORIDE 10 MG: 10 TABLET, EXTENDED RELEASE ORAL at 09:19

## 2021-01-01 RX ADMIN — POLYETHYLENE GLYCOL 3350 17 G: 17 POWDER, FOR SOLUTION ORAL at 11:46

## 2021-01-01 RX ADMIN — POLYETHYLENE GLYCOL 3350 17 G: 17 POWDER, FOR SOLUTION ORAL at 09:00

## 2021-01-01 RX ADMIN — MIRTAZAPINE 15 MG: 15 TABLET, ORALLY DISINTEGRATING ORAL at 20:57

## 2021-01-01 RX ADMIN — IPRATROPIUM BROMIDE 2 SPRAY: 21 SPRAY, METERED NASAL at 08:37

## 2021-01-01 RX ADMIN — LATANOPROST 1 DROP: 50 SOLUTION/ DROPS OPHTHALMIC at 21:11

## 2021-01-01 RX ADMIN — MIRTAZAPINE 15 MG: 15 TABLET, ORALLY DISINTEGRATING ORAL at 20:41

## 2021-01-01 RX ADMIN — ALFUZOSIN HYDROCHLORIDE 10 MG: 10 TABLET, EXTENDED RELEASE ORAL at 08:36

## 2021-01-01 RX ADMIN — LATANOPROST 1 DROP: 50 SOLUTION/ DROPS OPHTHALMIC at 21:26

## 2021-01-01 RX ADMIN — MULTIPLE VITAMINS W/ MINERALS TAB 1 TABLET: TAB at 10:01

## 2021-01-01 RX ADMIN — BRINZOLAMIDE/BRIMONIDINE TARTRATE 1 DROP: 10; 2 SUSPENSION/ DROPS OPHTHALMIC at 11:20

## 2021-01-01 RX ADMIN — ENOXAPARIN SODIUM 40 MG: 40 INJECTION SUBCUTANEOUS at 21:24

## 2021-01-01 RX ADMIN — MIRTAZAPINE 15 MG: 15 TABLET, ORALLY DISINTEGRATING ORAL at 20:24

## 2021-01-01 RX ADMIN — IPRATROPIUM BROMIDE 2 SPRAY: 21 SPRAY, METERED NASAL at 13:52

## 2021-01-01 RX ADMIN — CLONAZEPAM 0.5 MG: 0.5 TABLET ORAL at 20:54

## 2021-01-01 RX ADMIN — BISACODYL 10 MG: 10 SUPPOSITORY RECTAL at 09:01

## 2021-01-01 RX ADMIN — BRINZOLAMIDE/BRIMONIDINE TARTRATE 1 DROP: 10; 2 SUSPENSION/ DROPS OPHTHALMIC at 20:47

## 2021-01-01 RX ADMIN — MIRTAZAPINE 15 MG: 15 TABLET, ORALLY DISINTEGRATING ORAL at 21:23

## 2021-01-01 RX ADMIN — BRINZOLAMIDE/BRIMONIDINE TARTRATE 1 DROP: 10; 2 SUSPENSION/ DROPS OPHTHALMIC at 20:56

## 2021-01-01 RX ADMIN — ALFUZOSIN HYDROCHLORIDE 10 MG: 10 TABLET, EXTENDED RELEASE ORAL at 10:32

## 2021-01-01 RX ADMIN — IPRATROPIUM BROMIDE 2 SPRAY: 21 SPRAY, METERED NASAL at 21:25

## 2021-01-01 RX ADMIN — IPRATROPIUM BROMIDE 2 SPRAY: 21 SPRAY, METERED NASAL at 09:19

## 2021-01-01 RX ADMIN — SENNOSIDES 2 TABLET: 8.6 TABLET, FILM COATED ORAL at 11:46

## 2021-01-01 RX ADMIN — IPRATROPIUM BROMIDE 2 SPRAY: 21 SPRAY, METERED NASAL at 20:48

## 2021-01-01 RX ADMIN — SENNOSIDES AND DOCUSATE SODIUM 2 TABLET: 8.6; 5 TABLET ORAL at 21:26

## 2021-01-01 RX ADMIN — TAMSULOSIN HYDROCHLORIDE 0.8 MG: 0.4 CAPSULE ORAL at 09:01

## 2021-01-01 RX ADMIN — OLANZAPINE 5 MG: 5 TABLET, ORALLY DISINTEGRATING ORAL at 04:24

## 2021-01-01 RX ADMIN — BRINZOLAMIDE/BRIMONIDINE TARTRATE 1 DROP: 10; 2 SUSPENSION/ DROPS OPHTHALMIC at 12:26

## 2021-01-01 RX ADMIN — ENOXAPARIN SODIUM 40 MG: 40 INJECTION SUBCUTANEOUS at 20:24

## 2021-01-01 RX ADMIN — IPRATROPIUM BROMIDE 2 SPRAY: 21 SPRAY, METERED NASAL at 20:32

## 2021-01-01 RX ADMIN — BRINZOLAMIDE/BRIMONIDINE TARTRATE 1 DROP: 10; 2 SUSPENSION/ DROPS OPHTHALMIC at 10:33

## 2021-01-01 RX ADMIN — BRINZOLAMIDE/BRIMONIDINE TARTRATE 1 DROP: 10; 2 SUSPENSION/ DROPS OPHTHALMIC at 20:30

## 2021-01-01 RX ADMIN — BRINZOLAMIDE/BRIMONIDINE TARTRATE 1 DROP: 10; 2 SUSPENSION/ DROPS OPHTHALMIC at 08:37

## 2021-01-01 RX ADMIN — SODIUM CHLORIDE 1000 ML: 9 INJECTION, SOLUTION INTRAVENOUS at 15:31

## 2021-01-01 RX ADMIN — IPRATROPIUM BROMIDE 2 SPRAY: 21 SPRAY, METERED NASAL at 20:57

## 2021-01-01 RX ADMIN — SENNOSIDES 2 TABLET: 8.6 TABLET, FILM COATED ORAL at 09:01

## 2021-01-01 RX ADMIN — IPRATROPIUM BROMIDE 2 SPRAY: 21 SPRAY, METERED NASAL at 09:02

## 2021-01-01 ASSESSMENT — ACTIVITIES OF DAILY LIVING (ADL)
ADLS_ACUITY_SCORE: 11
ADLS_ACUITY_SCORE: 11
ADLS_ACUITY_SCORE: 5
ADLS_ACUITY_SCORE: 5
ADLS_ACUITY_SCORE: 13
ADLS_ACUITY_SCORE: 11
ADLS_ACUITY_SCORE: 13
ADLS_ACUITY_SCORE: 9
ADLS_ACUITY_SCORE: 11
ADLS_ACUITY_SCORE: 11
ADLS_ACUITY_SCORE: 13
ADLS_ACUITY_SCORE: 11
ADLS_ACUITY_SCORE: 13
ADLS_ACUITY_SCORE: 12
ADLS_ACUITY_SCORE: 13
ADLS_ACUITY_SCORE: 13
ADLS_ACUITY_SCORE: 9
ADLS_ACUITY_SCORE: 13
ADLS_ACUITY_SCORE: 5
ADLS_ACUITY_SCORE: 11
ADLS_ACUITY_SCORE: 13
ADLS_ACUITY_SCORE: 11
ADLS_ACUITY_SCORE: 13
ADLS_ACUITY_SCORE: 11
ADLS_ACUITY_SCORE: 13
ADLS_ACUITY_SCORE: 13
ADLS_ACUITY_SCORE: 11
ADLS_ACUITY_SCORE: 13
ADLS_ACUITY_SCORE: 11
ADLS_ACUITY_SCORE: 13
ADLS_ACUITY_SCORE: 11
DRESS: SCRUBS (BEHAVIORAL HEALTH);STREET CLOTHES
ADLS_ACUITY_SCORE: 13
ADLS_ACUITY_SCORE: 5
ADLS_ACUITY_SCORE: 11
ADLS_ACUITY_SCORE: 11
ADLS_ACUITY_SCORE: 13
ADLS_ACUITY_SCORE: 11
ADLS_ACUITY_SCORE: 9
ADLS_ACUITY_SCORE: 5
ADLS_ACUITY_SCORE: 13
ADLS_ACUITY_SCORE: 9
ADLS_ACUITY_SCORE: 5
ADLS_ACUITY_SCORE: 13
ADLS_ACUITY_SCORE: 11
ADLS_ACUITY_SCORE: 13
ADLS_ACUITY_SCORE: 11
ADLS_ACUITY_SCORE: 9
ADLS_ACUITY_SCORE: 5
ADLS_ACUITY_SCORE: 5
ADLS_ACUITY_SCORE: 9
ADLS_ACUITY_SCORE: 13
ADLS_ACUITY_SCORE: 9
DRESS: WITH ASSISTANCE;SCRUBS (BEHAVIORAL HEALTH);STREET CLOTHES
ADLS_ACUITY_SCORE: 13
ADLS_ACUITY_SCORE: 11
ADLS_ACUITY_SCORE: 13
DRESS: STREET CLOTHES
ADLS_ACUITY_SCORE: 11
ADLS_ACUITY_SCORE: 13
ADLS_ACUITY_SCORE: 11
ADLS_ACUITY_SCORE: 5

## 2021-01-01 ASSESSMENT — ENCOUNTER SYMPTOMS
CONFUSION: 1
APPETITE CHANGE: 1
ABDOMINAL PAIN: 0
FEVER: 0
FEVER: 0
DIZZINESS: 0
ABDOMINAL PAIN: 0
SHORTNESS OF BREATH: 1
SHORTNESS OF BREATH: 1
VOMITING: 0
DIFFICULTY URINATING: 1

## 2021-01-01 ASSESSMENT — PATIENT HEALTH QUESTIONNAIRE - PHQ9
SUM OF ALL RESPONSES TO PHQ QUESTIONS 1-9: 6
10. IF YOU CHECKED OFF ANY PROBLEMS, HOW DIFFICULT HAVE THESE PROBLEMS MADE IT FOR YOU TO DO YOUR WORK, TAKE CARE OF THINGS AT HOME, OR GET ALONG WITH OTHER PEOPLE: SOMEWHAT DIFFICULT
SUM OF ALL RESPONSES TO PHQ QUESTIONS 1-9: 6

## 2021-01-01 ASSESSMENT — PAIN SCALES - GENERAL: PAINLEVEL: NO PAIN (0)

## 2021-01-01 ASSESSMENT — MIFFLIN-ST. JEOR
SCORE: 1457.01
SCORE: 1480.15
SCORE: 1493.76

## 2021-01-01 ASSESSMENT — CHADS2 SCORE: AGE GREATER THAN OR EQUAL TO 75: YES

## 2021-10-22 NOTE — ED NOTES
Bed: ED18  Expected date:   Expected time:   Means of arrival:   Comments:  443  76 M psych eval  1824

## 2021-10-22 NOTE — ED PROVIDER NOTES
History   Chief Complaint:  Suicidal Ideation and Shortness of Breath    The history is provided by the patient and the EMS personnel.      Ad Bryan is a 76 year old male who presents via EMS for evaluation of suicidal ideation and shortness of breath. The patient is brought via EMS for concerns of suicidal ideation. EMS states per patient's family that has been making suicidal statements and has had poor oral and fluid intake at home. Made statements of wanting to end his life and giving up. Family also reports he is not taking his medications and they have hid sharp objects and medications. Patient went with EMS voluntarily here. Upon evaluation here patient notes some problems with his chronic shortness of breath he has had in the past secondary to blood clotting. He further describes the shortness of breath as only exertional but states he feels it at rest at this time. He denies any suicidal ideation and suicide attempts. States he has not been on medications for depression in the past. He denies any fever or abdominal pain.     Review of Systems   Constitutional: Negative for fever.   Respiratory: Positive for shortness of breath.    Gastrointestinal: Negative for abdominal pain.   Psychiatric/Behavioral: Negative for self-injury and suicidal ideas.   All other systems reviewed and are negative.    Allergies:  Simvastatin    Medications:  Aspirin 81 mg  Atorvastatin  Multivitamin  Tamsulosin    Past Medical History:     Mixed hyperlipidemia      Past Surgical History:    Colonoscopy  HC repair sliding inguinal hernia  Hemorrhoidectomy  Rotator Cuff Repair     Family History:    Respiratory   Hypertension  Breast Cancer x3    Social History:  Patient presents to ED alone.     Physical Exam     Patient Vitals for the past 24 hrs:   BP Temp Temp src Pulse Resp SpO2 Height Weight   10/22/21 1847 (!) 166/97 98.2  F (36.8  C) Temporal 69 18 100 % 1.829 m (6') 72.6 kg (160 lb)     Physical  Exam  General: Alert, appears well-developed and well-nourished. Cooperative.     In mild distress, pacing in the room  HEENT:  Head:  Atraumatic  Ears:  External ears are normal  Mouth/Throat:  Oropharynx is without erythema or exudate and mucous membranes are moist.   Eyes:   Conjunctivae normal and EOM are normal. No scleral icterus.  CV:  Normal rate, regular rhythm, normal heart sounds and radial pulses are 2+ and symmetric.  No murmur.  Resp:  Breath sounds are clear bilaterally    Non-labored, no retractions or accessory muscle use  GI:  Abdomen is soft, no distension, no tenderness. No rebound or guarding.  No CVA tenderness bilaterally  MS:  Normal range of motion. No edema.    Normal strength in all 4 extremities.     Back atraumatic.    No midline cervical, thoracic, or lumbar tenderness  Skin:  Warm and dry.  No rash or lesions noted.  Neuro: Alert. Normal strength.  GCS: 15  Psych:  Depressed mood and flat affect.  Denies active suicidal ideation.  Denies HI.  Denies hallucinations.     Emergency Department Course   ECG  ECG obtained at 19:01:13, ECG read at 1903  Sinus rhythm with premature atrial complexes, Left axis deviation, Abnormal ECG    No significant change as compared to prior, dated 9/12/17.  Rate 66 bpm. CO interval 112 ms. QRS duration 92 ms. QT/QTc 374/392 ms. P-R-T axes 44 -41 19.     Imaging:  CT Chest Pulmonary Embolism w Contrast    (Results Pending)     Report per radiology    Laboratory:  Labs Ordered and Resulted from Time of ED Arrival Up to the Time of Departure from the ED   D DIMER QUANTITATIVE - Abnormal; Notable for the following components:       Result Value    D-Dimer Quantitative 0.56 (*)     All other components within normal limits    Narrative:     This D-dimer assay is intended for use in conjunction with a clinical pretest probability assessment model to exclude pulmonary embolism (PE) and deep venous thrombosis (DVT) in outpatients suspected of PE or DVT. The  cut-off value is 0.50 ug/mL FEU.   BASIC METABOLIC PANEL - Abnormal; Notable for the following components:    Glucose 100 (*)     All other components within normal limits   CBC WITH PLATELETS AND DIFFERENTIAL - Abnormal; Notable for the following components:    WBC Count 3.6 (*)     RBC Count 3.68 (*)     Hemoglobin 11.8 (*)     Hematocrit 35.4 (*)     All other components within normal limits   TROPONIN I - Normal   TSH WITH FREE T4 REFLEX - Normal   COVID-19 VIRUS (CORONAVIRUS) BY PCR - Normal    Narrative:     Testing was performed using the marylou  SARS-CoV-2 & Influenza A/B Assay on the marylou  Francheska  System.  This test should be ordered for the detection of SARS-COV-2 in individuals who meet SARS-CoV-2 clinical and/or epidemiological criteria. Test performance is unknown in asymptomatic patients.  This test is for in vitro diagnostic use under the FDA EUA for laboratories certified under CLIA to perform moderate and/or high complexity testing. This test has not been FDA cleared or approved.  A negative test does not rule out the presence of PCR inhibitors in the specimen or target RNA in concentration below the limit of detection for the assay. The possibility of a false negative should be considered if the patient's recent exposure or clinical presentation suggests COVID-19.  [facility] are certified under the Clinical Laboratory Improvement Amendments of 1988 (CLIA-88) as qualified to perform moderate and/or high complexity laboratory testing.   DOCUMENT IN LEGAL HOLD NAVIGATOR   CBC WITH PLATELETS & DIFFERENTIAL    Narrative:     The following orders were created for panel order CBC with platelets differential.  Procedure                               Abnormality         Status                     ---------                               -----------         ------                     CBC with platelets and d...[[id number]]  Abnormal            Final result                 Please view results  for these tests on the individual orders.     Emergency Department Course:  Reviewed:  I reviewed nursing notes, vitals, past medical history and Care Everywhere    Assessments:  1842 I obtained history and examined the patient as noted above.    1957 Patient rechecked and updated.     2145 Patient rechecked and I spoke with wife regarding plan for care while in ED.  Pending CT results.     2200 Signed out to Dr. Sanchez pending CT results and likely disposition to DEC.    Disposition:  Care of the patient was transferred to my colleague Dr. Sanchez pending imaging.     Impression & Plan   Medical Decision Making:  Patient is a 76-year-old male who presents for suicidal ideation.  Patient has been having increasing depression and poor functionality at home according to wife.  He is very despondent and family is quite concerned for patient's mental health and wellbeing.  Patient did describe some chronic dyspnea or shortness of breath this evening and so work-up was performed.  Of note, he did have a recently diagnosed superficial venous clot of the right lower extremity as well as a deep venous thrombosis within the left calf of the left lower extremity.  He is not on blood thinner medications at this time.  EKG reassuringly showed no concerning ischemic changes and troponin undetectable, lower concern for ACS.  Patient had no wheezing on examination, so lower concern for reactive airway disease.  D-dimer returned mildly elevated and so we did obtain a CT scan of the chest to evaluate for potential pulmonary embolism particularly in the setting of known DVT.  CT scan of the chest with contrast is pending at this time.  Assuming CT scan of the chest is unremarkable patient would be safe for medical clearance and transfer to the empath unit for definitive psychiatric management.  He remains on a health officer hold at this time.  Care signed out to my partner Dr. Sanchez pending final CT results and eventual empath  clearance.    Diagnosis:    ICD-10-CM    1. Suicidal ideation  R45.851    2. Shortness of breath  R06.02      Discharge Medications:  New Prescriptions    No medications on file     Scribe Disclosure:  I, Rashad Burkett, am serving as a scribe at 6:45 PM on 10/22/2021 to document services personally performed by Yeison Sneed MD based on my observations and the provider's statements to me.            Yeison Sneed MD  10/22/21 4625

## 2021-10-23 PROBLEM — R45.851 SUICIDAL IDEATION: Status: ACTIVE | Noted: 2021-01-01

## 2021-10-23 PROBLEM — R06.02 SHORTNESS OF BREATH: Status: ACTIVE | Noted: 2021-01-01

## 2021-10-23 PROBLEM — F32.A DEPRESSION, UNSPECIFIED DEPRESSION TYPE: Status: ACTIVE | Noted: 2021-01-01

## 2021-10-23 NOTE — PROGRESS NOTES
LMHP and Provider updated on pt and status. He continues to pull away when attempt to obtain his vitals.

## 2021-10-23 NOTE — ED NOTES
Deferred giving patient medicare observation handout as is resting with eyes closed and is also confused to date and time.  Handout placed in chart.

## 2021-10-23 NOTE — PROGRESS NOTES
"Pt is awake, he has a very flat affect. He reports he is having pain\"all over\" but refuses any intervention. He is sitting still in the recliner and rests with his eyes closed.  He refuses any oral intake. He keeps saying \"I'm sorry\", and \"I hurt a lot of people.\" He asks what we are going to do with his body, and tells writer \"to just throw me aside.\" He is pleasant but wants no intervention and refuses care. Warm blanket applied, reassurance given, will keep checking in on him and offering comfort measures/meds/fluids/etc.  "

## 2021-10-23 NOTE — ED PROVIDER NOTES
Sign Out Note    Pt accepted in sign out from: Dr. Sneed    Briefly pt presented to the ED for: acutely decompensated, depressed    Plan at time of sign out: await CT and send to E[facility] is no PE.     Care of patient during my shift: no issues. Ct returned neg for pe. Informed patient and wife at bedside. covid neg too.     Plan for patient at this time: patient medically cleared per work up from Dr. Sneed with neg ct now and patient cleared for empath.     Noé Sanchez, DO  10/22/21 2344

## 2021-10-23 NOTE — ED NOTES
Spoke with on-call MD about patient's inability to void.  ED MD would like to place urinary cath and return to [facility] with a leg bag.  Dr. Bradley ok with this plan.

## 2021-10-23 NOTE — ED PROVIDER NOTES
"[facility] - Initial Psychiatric Observation Note  [facility] Emergency Department  Observation Initiation Date: Oct 22, 2021    Ad Bryan MRN: [id number]   Age: 76 year old YOB: 1945     History     Chief Complaint   Patient presents with     Depression     HPI  Ad Bryan is a 76 year old male with a past history notable for no prior psychiatric illnesses.  He presented to the ED for suicidal ideation and shortness of breath.  He has a history of blood clots, thus a CT of chest was performed to rule out PE.  He is experiencing difficulty voiding with urinary retention.  A urinary catheter was placed, connected to a leg bag.  Once medically cleared, he was transferred to [facility] for psychiatric assessment.    Patient lives at home with his wife. She notes he has been significantly declining in the past 10 days.  He is no longer eating or drinking and is refusing his medications.  He was evaluated at the [facility] and started on Lexapro for probable depression.  He did not tolerate this well, and appeared to get more agitated and restless, thus is was stopped.  He has been making comments about wanting to end it all. On examination, Ad denies suicidal thoughts. He denies gathering any weapons to harm himself.  He states his mood is \"up and down.\" When asked what he meant by down, he described it as \"not moving fast enough.\"  During our conversation, it was noted he responded to several questions with inappropriate responses.  When asked why he stopped taking his medications he stated \"he was trying to move stuff.\"  He also talked about being \"caught in the middle\" and \"multiple lateral.\" He was able to identify the date, president, and year but could not identify where he was at.  He agreed he has been experiencing symptoms of depression such as poor appetite, low mood, no enjoyment in life, difficulty concentrating. He reports sleeping well.    Past Medical History  Past Medical History: "   Diagnosis Date     JOINT PAIN-SHLDER 9/6/2007     MIXED HYPERLIPIDEMIA 9/6/2007     Past Surgical History:   Procedure Laterality Date     COLONOSCOPY  2007    [facility]     HC REPAIR SLIDING INGUINAL HERNIA       HEMORRHOIDECTOMY       ROTATOR CUFF REPAIR RT/LT Right 03/2017    in [place]     alfuzosin ER (UROXATRAL) 10 MG 24 hr tablet  aspirin 81 MG EC tablet  atorvastatin (LIPITOR) 10 MG tablet  brinzolamide-brimonidine (SIMBRINZA) 1-0.2 % ophthalmic suspension  ciclopirox (PENLAC) 8 % external solution  clonazePAM (KLONOPIN) 0.5 MG tablet  escitalopram (LEXAPRO) 10 MG tablet  ipratropium (ATROVENT) 0.03 % nasal spray  latanoprost (XALATAN) 0.005 % ophthalmic solution  Multiple Vitamin (MULTIVITAMIN OR)  nystatin (MYCOSTATIN) 957128 UNIT/ML suspension  omega-3 acid ethyl esters (LOVAZA) 1 G capsule  Probiotic Product (PROBIOTIC PO)  tadalafil (CIALIS) 5 MG tablet      Allergies   Allergen Reactions     Simvastatin      Muscle ache     Family History  Family History   Problem Relation Age of Onset     Respiratory Mother      Hypertension Father      Breast Cancer Sister      Breast Cancer Sister      Breast Cancer Sister      Social History   Social History     Tobacco Use     Smoking status: Never Smoker     Smokeless tobacco: Never Used   Substance Use Topics     Alcohol use: Yes     Comment: social     Drug use: No      Past medical history, past surgical history, medications, allergies, family history, and social history were reviewed with the patient. No additional pertinent items.       Review of Systems  A complete review of systems was performed with pertinent positives and negatives noted in the HPI, and all other systems negative.    Physical Examination   BP: (!) 166/97  Pulse: 69  Temp: 98.2  F (36.8  C)  Resp: 18  Height: 182.9 cm (6')  Weight: 72.6 kg (160 lb)  SpO2: 100 %    Physical Exam  General: Appears stated age.   Neuro: Alert and fully oriented. Extremities appear to demonstrate normal strength  on visual inspection.   Integumentary/Skin: no rash visualized, normal color    Psychiatric Examination   Appearance: awake, alert  Attitude:  evasive  Eye Contact:  good  Mood:  depressed  Affect:  intensity is blunted  Speech:  clear, coherent  Psychomotor Behavior:  no evidence of tardive dyskinesia, dystonia, or tics  Thought Process:  disorganized  Associations:  no loose associations  Thought Content:  no evidence of suicidal ideation or homicidal ideation and no evidence of psychotic thought  Insight:  limited  Judgement:  limited  Oriented to:  time, person, and place  Attention Span and Concentration:  fair  Recent and Remote Memory:  limited  Language: able to name/identify objects without impairment  Fund of Knowledge: intact with awareness of current and past events    ED Course        Labs Ordered and Resulted from Time of ED Arrival Up to the Time of Departure from the ED   D DIMER QUANTITATIVE - Abnormal; Notable for the following components:       Result Value    D-Dimer Quantitative 0.56 (*)     All other components within normal limits    Narrative:     This D-dimer assay is intended for use in conjunction with a clinical pretest probability assessment model to exclude pulmonary embolism (PE) and deep venous thrombosis (DVT) in outpatients suspected of PE or DVT. The cut-off value is 0.50 ug/mL FEU.   BASIC METABOLIC PANEL - Abnormal; Notable for the following components:    Glucose 100 (*)     All other components within normal limits   CBC WITH PLATELETS AND DIFFERENTIAL - Abnormal; Notable for the following components:    WBC Count 3.6 (*)     RBC Count 3.68 (*)     Hemoglobin 11.8 (*)     Hematocrit 35.4 (*)     All other components within normal limits   TROPONIN I - Normal   TSH WITH FREE T4 REFLEX - Normal   COVID-19 VIRUS (CORONAVIRUS) BY PCR - Normal    Narrative:     Testing was performed using the marylou  SARS-CoV-2 & Influenza A/B Assay on the marylou  Francheska  System.  This test should be  ordered for the detection of SARS-COV-2 in individuals who meet SARS-CoV-2 clinical and/or epidemiological criteria. Test performance is unknown in asymptomatic patients.  This test is for in vitro diagnostic use under the FDA EUA for laboratories certified under CLIA to perform moderate and/or high complexity testing. This test has not been FDA cleared or approved.  A negative test does not rule out the presence of PCR inhibitors in the specimen or target RNA in concentration below the limit of detection for the assay. The possibility of a false negative should be considered if the patient's recent exposure or clinical presentation suggests COVID-19.  [facility] are certified under the Clinical Laboratory Improvement Amendments of 1988 (CLIA-88) as qualified to perform moderate and/or high complexity laboratory testing.   UA MACROSCOPIC WITH REFLEX TO MICRO AND CULTURE - Normal    Narrative:     Microscopic not indicated   DOCUMENT IN LEGAL HOLD NAVIGATOR   CBC WITH PLATELETS & DIFFERENTIAL    Narrative:     The following orders were created for panel order CBC with platelets differential.  Procedure                               Abnormality         Status                     ---------                               -----------         ------                     CBC with platelets and d...[[id number]]  Abnormal            Final result                 Please view results for these tests on the individual orders.       Assessments & Plan (with Medical Decision Making)   Patient presenting with severe depression and cognitive decline . Nursing notes reviewed noting no acute issues.     I have reviewed the assessment completed by the [facility].     During the observation period, the patient did not require medications for agitation, and did not require restraints/seclusion for patient and/or provider safety.     The patient was found to have a psychiatric condition that would benefit from an observation  stay in the emergency department for further psychiatric stabilization and/or coordination of a safe disposition. The observation plan includes serial assessments of psychiatric condition, potential administration of medications if indicated, further disposition pending the patient's psychiatric course during the monitoring period.     Ad will need a higher level of care from [facility]. He is displaying neurocognitive decline with recent change in behavior along with not eating or drinking.  This certainly could be due to a major depressive episode, possibly with catatonia, yet medical causes should be further investigated while he is on [facility] and we await inpatient psychiatric placement.  CT of the Head will be ordered for today. Additional labs, magnesium and B1 placed.  EKG was completed in ED. May also need to consider a medical bed for failure to thrive if he continues to refuse to eat or drink and we are not able to get him into a psychiatric unit in a timely manner.  Would also consider trying a dose of ativan to see if this improves his mood. Will await results of head CT first.    Preliminary diagnosis:    ICD-10-CM    1. Depression, unspecified depression type  F32.A    2. Suicidal ideation  R45.851    3. Shortness of breath  R06.02         Treatment Plan:  -Place on observation status for safety monitoring while anticipating probable transfer to a geriatric psychiatric unit to manage poor functioning.  Patient is willing to transfer at this time, but would be holdable in my opinion if he demanded to leave.  -Resume home medications. Patient has been refusing to take, but should make an attempt to offer.  It is likely his refusal to take alfuzosin is contributing to his urinary retention.  -Resume klonopin as needed for anxiety.  -CT of head due to acute change in behavior  -Check vitamin B1 and magnesium level  -Stop lexapro due to agitation.  -Start remeron 15 mg dissolvable form at night to target  depression. Hopefully it will be more tolerable as it is not at serotonergic at lexapro. It also my improve his appetite.  -Monitor intake. Continue to encourage eating meals. Nursing has been ordering foods his wife notes he usually likes.     --  JONATHON Garcia CNP   [facility] EMERGENCY DEPT  [facility] Unit  10/22/2021        Tete Villalobos APRN CNP  10/23/21 1727       Tete Villalobos APRN CNP  10/23/21 0898

## 2021-10-23 NOTE — CONSULTS
"10/22/2021  Ad Bryan 1945     [facility] Mental Health Assessment:    Started at: 11:10pm  Completed at: 12:05am  What type of assessment are you doing today? Crisis assessment    1.  Presenting Problem:      Referral Method to ED? Family/Friends     What brings the patient to the ED today?     Pt is a 76 year old cisgender male who was brought to the ED for evaluation of suicidal thoughts and shortness of breath. After being medically cleared the pt was transferred to the empath unit for mental health assessment. The pt denied psychosis, rayshawn, eating disorder and trauma symptoms both currently and historically. The pt did endorse anxiety and depressive symptoms. During the assessment, the pt was unable to articulate what brought him to the hospital today. He stated his mental health was fine and denied all questions related to suicidality.    The pt's wife, Anita ([phone]), reported that the pt has been making vague suicidal statements for little over a week now. Examples included \"I'm over it\" and \"I don't want to do this anymore.\" She stated that the pt has a lot of frustration with ongoing physical health concerns that have not been resolved despite several appointments with outside providers. Additionally, since the onset of covid the pt hasn't left his home or seen anyone including family members (with two exceptions over the past year and a half) due to such high anxiety about getting the virus. Anita reported that she has watched the pt become more and more isolative, irritable and down over the past month specifically. She also noted that the pt's memory and ability to concentrate has been \"snowballing\" recently and that he barely drinks on full glass of any kind of fluid per day.    Has this happened before? No    Duration of presenting problem: about a month      2.  Risk Assessment:  Suicide and Self-Harm    ESS-6  1.a. Over the past 2 weeks, have you had thoughts of killing yourself? No " "  1.b. Have you ever attempted to kill yourself and, if yes, when did this last happen? No  2. Recent or current suicide plan? No  3. Recent or current intent to act on ideation? No  4. Lifetime psychiatric hospitalization? No  5. Pattern of excessive substance use? No  6. Current irritability, agitation, or aggression? No  ESS-6 Score: negligible    SI: Passive  Plan: No  Intent: No   Prior Attempts: No     Protective Factors: supportive wife, stable housing, access to healthcare    Hopes and goals for the future: \"getting out of here\"    Coping Skills: What helps and doesn't help? Talking to my wife    Additional Risk Factors Related to Safety and Suicide: Yes: Depressive symptoms, Health stressors, Poor decision making and Restless/agitated    Is the patient engaged in self injurious behaviors? No     Risk to Others    Aggressive/Assaultive/Homicidal Risk Factors: No     Duty to Warn? No     Was a Child Protection Report Made? No       Was a Adult Protection Report Made? No        Sexually inappropriate behavior? No        Vulnerability to sexual exploitation? No       3. Mental Health Symptoms and Substance Use  Current Symptoms and Mental Health History    GAIN Short Screener (GAIN-SS) administered? NA    Attention, Hyperactivity, and Impulsivity Symptoms      Patient reported symptoms related to hyperactivity, inattention, or impulsivity? Yes: Disorganized/Forgetful, Inattentive and Restless       Anxiety Symptoms    Patient reported anxiety symptoms? Yes: Generalized Symptoms: Agitation, Cognitive anxiety - feelings of doom, racing thoughts, difficulty concentrating , Excessive worry and Pacing         Behavioral Difficulties    Patient reported behavioral difficulties? Yes: Apathy, Wandering and Withdrawal/Isolation       Mood Symptoms    Patient reported mood disorder symptoms? Yes: Appetite change/weight change , Feelings of hopelessness , Feelings of worthlessness , Impaired concentration, Impaired " "decision making , Isolative , Loss of interest / Anhedonia , Sad, depressed mood  and Sleep disturbance        Eating Disorders and Appetite Disturbance      Patient reported appetite symptoms? No       Interpersonal Functioning     Patient reported difficulties that may be associated with personality and interpersonal functioning? No      Learning Disabilities/Cognitive/Developmental Disorders    Patient reported concerns related to learning disabilities, cognitive challenges, and/or developmental disorders? No       General Cognitive Impairments    Patient reported symptoms of cognitive impairments? Yes: Decision-Making, Judgment/Insight and Orientation  If yes, complete Mini-Cog Assessment.      Mini-Cog Assessment  Instructions:  1. Ask the patient to listen carefully and remember three unrelated words (ex. Table, apple, sourav).  2. Ask the patient to draw a clock: first the [place], then the numbers, then the hands at a specific time (ex. 11:10am).  3. Ask the patient to repeat the three words.    Number of Words Recalled: 3  Clock-Drawing Test: 0 (Abnormal)   Mini-Cog Total Score: 3  Details: pt was very irritable when asked to draw the clock, simply put dashes and scribbles instead of numbers and stated \"you know what a clock looks like\" before pushing the paper away and declaring he was done with the drawing.      Sleep Disturbance    Patient reported difficulties with sleep? Yes: Difficulty falling asleep  and Difficulty staying sleep         Psychosis Symptoms    Patient reported symptoms of psychosis? No        Trauma and Post-Traumatic Stress Disorder    Physical Abuse: No   Emotional/Psychological Abuse: No  Sexual Abuse: No  Loss of a friend or family member to suicide: Yes pt did not disclose who or when  Other Traumatic Event: No     Patient reported trauma related symptoms? No       Impact of Mental Health on Functioning      Negative Impact Score: 1/10   Subjective Impact on functioning: pt stated " he did not understand why he was at the hospital because his mental health is fine.  How do symptoms vary from baseline? Pt reported no symptoms that vary from his baseline    Current and Historical Substance Use Note:    IIs there a history of, or current, substance use? No     Have you been to chemical dependency treatment or detox before? No     Drug screen completed? No   BAL/Breathalyzer completed? No       Mental Status Exam:    Affect: Blunted and Flat  Appearance: Disheveled   Attention Span/Concentration: Inattentive    Eye Contact: Variable  Fund of Knowledge: Delayed   Language /Speech Content: Fluent  Language /Speech Volume: Soft   Language /Speech Rate/Productions: Minimally Responsive and Slow   Recent Memory: Variable  Remote Memory: Variable  Mood: Anxious, Apathetic and Irritable   Orientation:   Person: Yes   Place: Yes  Time of Day: No   Date: Yes   Situation (Do they understand why they are here?): No   Psychomotor Behavior: Normal   Thought Content: Clear  Thought Form: Intact    4. Social and Environmental Conditions   Is the patient their own guardian? Yes    Living Situation: With others: with wife    Support system and quality of connections: supportive wife    Income source: Other: savings    Issues with employment or education: No    Legal Concerns  Do you have any history of or current involvement with the legal system? No    Spiritual and Cultural Influences  Do you have any Buddhist beliefs that are important in your life? No     Do you have any cultural influences in your life that impact your mental health care? No        5. Psychiatric History, Medical History, and Current Care      Patient Mental Health Services   Does the patient have a history of mental health concerns/diagnoses? No     Current Providers  Primary Care Provider: Yes [facility]un [facility] at [facility]   Psychiatrist: No   Therapist: No   : No   [facility]: No   ACT Team: No   Other: No    History of Commitment?  No  History of Psychiatric Hospitalizations? No   History of programmatic care? No    Family Mental Health History   Family History of Mental Health or Chemical Dependency Issues? No     Development and Physical Health Challenges  Delays or concerns meeting developmental milestones? No  Current psychotropic medications? No   Medication Compliant? Yes   Recent medication changes? Yes    History of concussion or TBI? No     Additional Information: pt stopped taking lexapro 3 days ago because they were making him agitated     6. Collateral Information and Collaboration    Collaboration with medical staff:Referral Information:   Medical Records and Nursing     Collateral Information/Sources: Family: wife: Anita    7. Assessment and Diagnosis  Assessment of patient strengths and vulnerabilities    Strengths, Protective Factors, & Community Resources: established care providers and supportive family members    Patient skills, abilities, and coping skills (what is going well?): pt has a very supportive wife, stable housing and access to healthcare    Patient vulnerabilities: pt is experiencing high levels of frustration and unknown cognitive and memory deficits    Diagnosis  Major Depressive Disorder F32.1    8.Therapeutic Methodologies Utilized in Assessment    Psychotherapy techniques and/or interventions used: Establishing rapport and Active listening    9. Patient Care/Treatment Plan  Summary of Patient Presentation and needs  What are the basic needs for this patient in this moment? Medication evaluation with psychiatry to address depressive symptoms      Consultations :  Attending provider consulted? Consult is still in process at the time of writing this note.  Information from this assessment will be communicated to the attending provider.   Attending Name: Mirna   Attending concurs with disposition? Determination in process, [facility] team will update as disposition is finalized.  See ED notes for further  information.       Recommended disposition: Other: medication review and referal for cognitive assessment     Does the patient agree with the recommended level of care? No: pt does not understand why he is at the hospital and wants to go home    Final disposition: defer to decision after morning assessment    Disposition Details: defer to decision after morning assessment    If Inpatient, is patient admitted voluntary? N/A   Patient aware of potential for transfer if there is not appropriate placement? NA  Patient is willing to travel outside of the [facility] for placement? NA   Central Intake Notified? NA    10. Patient Care Document: Safety and After Care Planning:          Safety Plan Provided? No    Follow-Up Plans and Providers: upon discharge, follow up with established providers    Follow-Up Plan:  After care plan provided to the patient/guardian by: not at this time  After care plan provided to any additional sources/parties? No    Duration of face to face time with patient in minutes: 1.0 hrs    CPT code(s) utilized: 56558 - Psychotherapy for Crisis - 60 (30-74*) min      Heidi Vivas

## 2021-10-23 NOTE — PROGRESS NOTES
"Assisted pt to BR to empty leg bag (which was full). He is confused and resistive with cares. He needs much direction and coaxing to leave his recliner. Gait is steady but needs direction of where to go and what to do, he gives a blank stare. Offered juice and water, \"I would like juice but I can't with the shape I'm in.\" Tries to assist him with it but he refuses the fluid and food, placed beside him on table side. He  also pulls away when attempt to get vitals. He sits still in the recliner and dozes off and on, does not to appear to be in distress. He does say he is in pain when ask him, \"all over but it;s ok.\" Refuses prn meds and interventions offered. Warm blanket applied. Will monitor.   "

## 2021-10-23 NOTE — TELEPHONE ENCOUNTER
Patient cleared and ready for behavioral bed placement: Yes     S;  Summer with [facility] present 76/M with SI, Depression and failure to function    B:  Pt was sent to ED by wife for failure of function, SI, depressoin and shortness of breath.  Presents at ED as depressed, SI, given up; can't eat/won't eat.  Lots of prompting to eat and drink.  Also confused.  No dementia.  No memory concerns.  Word Salad since depression hit a few weeks ago.  No UTI.  No concerns of psychosis. Full medical = DBT and urinary concerns.  Currently cathed with urinary bag.  Currently Medically stable.   Walk, very restless and pacing with golf swings.  No recent head injuries.  No psychotropic meds.  No precipitating event.  No aggression/HI    WBC Count 4.0 - 11.0 10e3/uL 3.6Low     RBC Count 4.40 - 5.90 10e6/uL 3.68Low     Hemoglobin 13.3 - 17.7 g/dL 11.8Low     Hematocrit 40.0 - 53.0 % 35.4Low       D-Dimer Quantitative 0.00 - 0.50 ug/mL FEU 0.56High       Covid neg  Medically Cleared    A:  Voluntary; but holdable if necessary    R:  Put on Worklist.  4:04 PM Provider Given Info to review    4:15 PM Discussed Pt with On Call Provider. Provider Declined Pt for 3B shared room.  Pt is not appropriate for roommate situation.  Might be more of a medical admit as Psych admits need to be able to consume food and drink by themselves.

## 2021-10-23 NOTE — PROGRESS NOTES
Pt continues to refuse oral intake. He is asking for his wife, reassured that she is at home safe, offered to call her but did not want to talk, as long as she was home.

## 2021-10-23 NOTE — PROGRESS NOTES
Pt is up walking and exercising his arms. He still needs reminders of leg bag/mora in place and reminders why it is there. He denies SI/HI, pain or hallucinations(he has been more talkative after speaking to his wife). He heeds much encouragement to eat and drink. Writer ordered soft food and items wife thought he would eat, he picked at the food and ate a small portion. Takes small drinks of fluids offered.  Wife would like to called with plan for pt once seen by Provider.

## 2021-10-23 NOTE — ED NOTES
"Pt brought into unit, declines having VS taken, states \"I just have to get out of here\". Able to change clothes with a lot of encouragement, refuses to take off shoes. Appears tense and restless. Will CTM.   "

## 2021-10-23 NOTE — ED TRIAGE NOTES
Family called EMS today because they are concerned about pt's mental health. Pt denies SI. Pt Aox4.

## 2021-10-23 NOTE — PROGRESS NOTES
Pt on phone talking to wife. Wife reports pt is eating but hs appetite has decreased. She also reports that he is taking very long periods of time to do every day tasks; such as, showers, using the bathroom, dressing. She has been making special  melas for him so he eats, she cuts his meats, and needs to caox him to eat and drink fluids. She feels he may not be drinking fluids so he does not have to use the bathroom. Writer has reminded him today that he has a cath in place so no need to worry about having to use the restroom to void.

## 2021-10-23 NOTE — ED NOTES
76 year old male with history of mixed hyperlipidemia, shortness of breath, and urinary retension received from ED due to SI.  Patient refusing to allow vital signs to be taken and was unable to void on unit and needed to return to the ED for a urinary drain.  ED MD and Dr.Bauer vazquez for patient to return to [facility] after catheter and leg bag  Patient is confused to place and time, is refusing medications, and has poor oral intake.  Denies HI. Nursing and risk assessments completed. Assessments reviewed with LMHP and physician. Video monitoring in progress, patient informed.  Admission information reviewed with patient. Patient given a tour of [facility] and instructions on using the facility. Questions regarding [facility] addressed. Pt search completed and belongings inventoried.

## 2021-10-23 NOTE — ED NOTES
Pt cooperative at start of shift, now is uncooperative and more confused. Walking around unit aimlessly. Spent over 30 mn in bathroom with writer hyperfocused on brushing his teeth and trying to get something unstuck. Illogical speech. Declined PRN medication.

## 2021-10-23 NOTE — TREATMENT PLAN
[facility] Treatment Plan    Client's Name: Ad Bryan  YOB: 1945    DSM-5 Diagnoses: F32.1 major depressive disorder, single episode, moderate    Psychosocial / Contextual Factors: Patient was brought to the ED for evaluation of suicidal thoughts and shortness of breath. The pt denied psychosis, rayshawn, eating disorder and trauma symptoms both currently and historically. The pt did endorse anxiety and depressive symptoms. During the assessment, the pt was unable to articulate what brought him to the hospital today. He stated his mental health was fine and denied all questions related to suicidality. However, collateral reported that the patient has been making passive suicidal statements over the past week.    Anticipated number of sessions or this episode of care: 1-4    MeasurableTreatment Goal(s) related to diagnosis / functional impairment(s)    Goal 1: Patient will increase awareness of depressive symptoms and their impact on functioning and develop skills to reduce the negative impact    Objective #A     Patient will describe thoughts, feelings and actions associated with depression  Status: New as of October 23, 2021    Intervention(s)  LMHP will explore and process with the patient how depression has impacted them.    Objective #B  Patient will increase depression coping skills.  Status: New as of October 23, 2021    Intervention(s)  LMHP will teach CBT skills and model their use.      Goal 2: Patient will reduce SI intensity and establish a sense of hope for the future    Objective #A     Patient will discuss underlying assumptions and self talk that may be contributing to hopelessness  Status: New as of October 23, 2021    Intervention(s)  LMHP will assist the patient in developing awareness and identifying the cognitive messages that reinforce hopelessness    Objective #B  Patient will make a list of positives in their life (relationships, achievements, supports, etc.)  Status: New as of  October 23, 2021    Intervention(s)  LM will assist patient in identifying positives in their life            Appearance:   Disheveled    Eye Contact:   Fair    Psychomotor Behavior: Restless    Attitude:   Cooperative    Orientation:   Person Place   Speech    Rate / Production: Slow  Mumbled    Volume:  Normal    Mood:    Anxious  Irritable  Apathetic   Affect:    Blunted  Flat  Lethargic    Thought Content:  Clear    Thought Form:  Coherent  Logical    Insight:    Poor           PLAN:   Patient will board in [facility] until patient and treatment deem it appropriate to either discharge or admit to a higher level of care. Details: pt will see provider and be re-assessed in the morning.      Heidi Vivas                                                           ________

## 2021-10-23 NOTE — PROGRESS NOTES
Pt walking around unit, exercising his arms. No SOB or distress is noted, he is confused. Chirinos cath, leg bag is patent, draining cloudy yellow urine. Output 650 ml thus far this shift. Denies pain at this time. Encouraged to ea tand drink fluids.

## 2021-10-23 NOTE — ED NOTES
"[facility] Reassessment and Progress Note    Client Name: Ad Bryan  Date: October 23, 2021       [facility]: Pt was initially sent in via family for concerns of suicidal statements and shortness of breath.      Current presentation on the unit:  Pt is medically clear and transferred to [facility].  At [facility] Pt has been observed as anxious and restless.  PT is shuffling aimlessly around the unit.  PT appears to have non goal directed activities via swinging his hands similar to a golf swing.  When asked direct questions such as are you feeling sad, he stated yes.  PT was unable to have a further conversation.  PT appeared to answer in mumbled statements.  PT was observed to have word salad at times.  PT then walked away and stated he wanted to talk to family via three way.  When asked if he needed help he stated yes, but then walked into the sensory room.  Pt then exited the sensory room and walked around [facility].  Writer attempted to inquire into any suicidal thoughts, Pt denied but then was unable to converse about why his wife was stating such.  PT kept stating \"I'm sorry\" but was unable to say why he is sorry.    Pt needs redirection from RN staff to not remove his cath/urine bag.  PT was prompted to eat /order food however he states that he is starving \"but just can't eat\".      Current risk to self or others? Yes Pt is unable to currently care of self.  PT is in need of further medicatl and psychiatric admission.  PT denies any suicidal ideation, however it unable to have a gainful discussion reguarding his safety.  Pt's wife additionally reports signficant concerns of safety and wellbeing.    Summary of therapeutic interventions completed with patient: Motivation interview, assessment of needs, disposition planning.     Treatment objectives addressed in this session: Pt's level of care will be increased to inpatient admission    Progress on treatment goals: " "None    Additional collateral information:     Writer spoke with Anita Leon's wife at .  She reports that there is no prior MH hx until about 10 days ago when he was seen at [facility] for the first time and dx with \"probable depression\".  She denies any prior MH providers including psychiatry, therapy, or neurology.  She reports that they used to live in [place] until a year ago and then got stuck here due to covid.  She states that over the last year she has seen a decline as far as being more isolative and withdrawn.  She reports more distrust of others overall .  She denies any overt psychosis, hallucinations, delusional thinking, or paranoia.  She reports that he was started on lexapro and the dose was slowly increased.  She notes a dramatic increase in restlessness and agitation with the dose being increased therefore it was discontinued.    She denies any current or recent concern of cognitive programs until these last 2 weeks and there has been no plan to complete any testing for Dementia etc.   She notes that the needing assistance or dressing/showering/eating has been new over this decline of his mental health.      Mental Status:     Appearance:   Disheveled  Inappropriate    Eye Contact:   Poor   Psychomotor Behavior: Restless    Attitude:   Cooperative  Evasive   Orientation:   Person   Speech    Rate / Production: Slow  Mumbled    Volume:  Soft    Mood:    Anxious  Depressed  Fearful   Affect:    Blunted  Constricted  Flat  Worrisome    Thought Content:  Poverty of thought word salad   Thought Form:  Word Salad Incoherent Irreverent answers   Insight:    Poor       Plan: admit to inpatient mental health    Disposition: Inpatient mental health     Rationale for disposition: Writer at time of assessment recommends that Pt be admitted to inpatient mental health.  Pt is in need of further assessment, evaluation, and stabilization of his mental health.    Reviewed assessment with attending provider: Wilfredo " NP     Total time spent with patient:.50 hrs     CPT code: 78795 - Psychotherapy (with patient) - 30 (16-37*) min      Summer Sneed [facility]

## 2021-10-23 NOTE — ED NOTES
ED RN who had patient earlier to [facility] to do a bladder scan.  Scan = 324 ml.  Patient returned to ED to place urinary drain via wheelchair.

## 2021-10-23 NOTE — PHARMACY-ADMISSION MEDICATION HISTORY
Pharmacy Medication History  Admission medication history interview status for the 10/22/2021  admission is complete. See EPIC admission navigator for prior to admission medications     Location of Interview: Patient room  Medication history sources: Surescripts and Care Everywhere    Significant changes made to the medication list:  Added nystatin, alfuzosin, Simbrinza, clonazepam, ciclopirox, escitalopram, ipratropium, latanoprost, tadalafil   Removed brimonidine eye gtt (included in Simbrinza combo product, no recent fill history), tamsulosin 0.4 mg daily (no fills since 3/3021)     In the past week, patient estimated taking medication this percent of the time: 50-90% due to other    Additional medication history information:   Patient declined medication history interview; only confirmed he had not taken any medications for at least 2 days PTA. PTA med list updated with medications filled in last 90 days/listed in Care Everywhere. Clonazepam last filled 9/22/21 for #10ds, nystatin filled 9/28/21 for #7ds, otherwise all prescriptions indicate chronic use.     Medication reconciliation completed by provider prior to medication history? No    Time spent in this activity: 25 minutes    Prior to Admission medications    Medication Sig Last Dose Taking? Auth Provider   alfuzosin ER (UROXATRAL) 10 MG 24 hr tablet Take 10 mg by mouth daily Unknown at Unknown time Yes Unknown, Entered By History   aspirin 81 MG EC tablet Take 81 mg by mouth daily. Unknown at Unknown time Yes Reported, Patient   atorvastatin (LIPITOR) 10 MG tablet Take 10 mg by mouth daily. Unknown at Unknown time Yes Reported, Patient   brinzolamide-brimonidine (SIMBRINZA) 1-0.2 % ophthalmic suspension Place 1 drop into both eyes 2 times daily Unknown at Unknown time Yes Unknown, Entered By History   ciclopirox (PENLAC) 8 % external solution Apply to adjacent skin and affected nails daily.  Remove with alcohol every 7 days, then repeat. Unknown at  Unknown time Yes Unknown, Entered By History   clonazePAM (KLONOPIN) 0.5 MG tablet Take 0.5 mg by mouth 3 times daily as needed for anxiety Unknown at Unknown time Yes Unknown, Entered By History   escitalopram (LEXAPRO) 10 MG tablet Take 10 mg by mouth daily Unknown at Unknown time Yes Unknown, Entered By History   ipratropium (ATROVENT) 0.03 % nasal spray Spray 2 sprays into both nostrils 3 times daily Unknown at Unknown time Yes Unknown, Entered By History   latanoprost (XALATAN) 0.005 % ophthalmic solution Place 1 drop into both eyes daily Unknown at Unknown time Yes Unknown, Entered By History   Multiple Vitamin (MULTIVITAMIN OR) Take 1 tablet by mouth daily. Unknown at Unknown time Yes Reported, Patient   nystatin (MYCOSTATIN) 692299 UNIT/ML suspension Take 500,000 Units by mouth 4 times daily Unknown at Unknown time Yes Unknown, Entered By History   omega-3 acid ethyl esters (LOVAZA) 1 G capsule Take 2 g by mouth 2 times daily Unknown at Unknown time Yes Reported, Patient   Probiotic Product (PROBIOTIC PO) Take 1 tablet by mouth daily Unknown at Unknown time Yes Reported, Patient   tadalafil (CIALIS) 5 MG tablet Take 5 mg by mouth daily as needed Unknown at Unknown time Yes Unknown, Entered By History       The information provided in this note is only as accurate as the sources available at the time of update(s)   Darrell ValdezD

## 2021-10-24 PROBLEM — F32.9 MAJOR DEPRESSION: Status: ACTIVE | Noted: 2021-01-01

## 2021-10-24 PROBLEM — R41.0 CONFUSION: Status: ACTIVE | Noted: 2021-01-01

## 2021-10-24 NOTE — PROGRESS NOTES
Explained need to pt for returning to ED, he was cooperative and taken to ED 18 via W/C. Report given to RN.

## 2021-10-24 NOTE — ED NOTES
The pt appears restless/ irritable. Declined all medications. Ate soup and few bites of ice cream for dinner. Writer updated pt's wife via phone.

## 2021-10-24 NOTE — ED NOTES
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  PT has Klonopin ordered, however has refused such.  Additional labs and head CT were ordered to rule out other concerns due to mental status change.

## 2021-10-24 NOTE — ED PROVIDER NOTES
History     Chief Complaint:  Confusion    The history is provided by the patient and medical records.      Ad Bryan is a 76 year old male with HLD who presents with confusion. Ad was seen in the ER yesterday for depressive symptoms and dyspnea. He had a work up, as below, for dyspnea and was then transferred to the [facility] unit. He has been in ER/[facility] for 40 hours. He had issues with urinary retention and ultimately required Chirinos catheter placement. [facility] they are unable to place him in a psychiatric facility based on his catheter needs and confusion.    On interview today, Ad tells me he was never suicidal and he does not know why someone would say that. He is unsure why he is here and is primarily concerned with his hat and jacket being returned to him. He has no current concerns such as pain, dizziness, or vomiting.    I reviewed extensive charting from the last 40 hours. It appears Ad has had very little PO intake in this time and has refused cares. It seems he has become more confused and less cooperative with time. At this point he is unable to provide meaningful history.    10/23/21  Laboratory:  Ddimer: 0.56 (A)  BMP: glucose 100 (A) o/w WNL (Creatinine 1.08)   CBC: WBC 3.6 (L), HGB 11.8 (L),   Troponin (Collected 2019): <0.015  TSH with free T4 reflex: 1.28  Magnesium 2.9 (H)    Asymptomatic COVID19 Virus PCR by nasopharyngeal swab negative     UA with microscopic:  o/w WNL     Imaging:   CT Chest Pulmonary Embolism w Contrast  1.  There is no pulmonary embolus, aortic aneurysm or dissection. No acute chest abnormality.  Reading per radiology     ECG  ECG taken at 1553  Sinus rhythm with Premature atrial complexes   Left axis deviation   Abnormal ECG    Rate 66 bpm. SC interval 112 ms. QRS duration 92 ms. QT/QTc 374/392 ms. P-R-T axes 44 -41 19.     Review of Systems   Unable to perform ROS: Mental status change (Confusion)   Constitutional: Positive  for appetite change. Negative for fever (in last 40 hours).   Respiratory: Positive for shortness of breath (initial presentation).    Cardiovascular: Negative for chest pain.   Gastrointestinal: Negative for abdominal pain and vomiting.   Genitourinary: Positive for difficulty urinating.   Neurological: Negative for dizziness.   Psychiatric/Behavioral: Positive for confusion. Negative for suicidal ideas.     Allergies:  Simvastatin    Medications:    Uroxaatral  Aspirin  Lipitor  Simbrinza eye drops  Clonazepam  Lexapro  Atrovent  Xalantan  MVI  Lovaza  Probiotic  Cialis     Past Medical History:    Hyperlipidemia  Depression  Glaucoma     Past Surgical History:    Repair inguinal hernia  Rotator cuff repair bilateral   Hemorrhoidectomy  Cataract extraction    Family History:    Father: hypertension  Sister: breast cancer    Social History:  Patient is unaccompanied       Physical Exam     Patient Vitals for the past 24 hrs:   BP Temp Temp src Pulse Resp SpO2   10/24/21 0914 -- -- -- -- -- 100 %   10/24/21 0912 (!) 159/80 -- -- 58 -- --   10/24/21 0534 (!) 158/76 -- -- -- 18 100 %   10/23/21 1601 138/78 97.9  F (36.6  C) Oral 70 14 100 %       Physical Exam  General: Well-developed and well-nourished. Well appearing elderly  man. Cooperative.  Head:  Atraumatic.  Eyes:  Conjunctivae, lids, and sclerae are normal.  Neck:  Supple. Normal range of motion.  CV:  Regular rate and rhythm. Normal heart sounds with no murmurs, rubs, or gallops detected.  Resp:  No respiratory distress. Clear to auscultation bilaterally without decreased breath sounds, wheezing, rales, or rhonchi.  GI:  Soft. Non-distended. Non-tender.   :  Chirinos catheter bag with medium yellow urine on the left leg.  MS:  Normal ROM. No bilateral lower extremity edema.  Skin:  Warm. Non-diaphoretic. No pallor.  Neuro: Awake and alert. Normal strength.  Psych:  Unable to accurately assess.  Vitals reviewed.    Emergency Department  Course     Imaging:  Head CT w/o contrast  1.  No CT evidence for acute intracranial abnormality.  2.  Volume loss and presumed chronic microvascular ischemic changes as above.  Reading per radiology    Laboratory:  CBC (1023): WBC 4.2, HGB 13.1 (L),    BMP (1023):  o/w WNL (Creatinine 1.02)      Emergency Department Course:    Reviewed:  I reviewed nursing notes, vitals, past history, and Care Everywhere.    Assessments:  0955 I obtained history and examined the patient as noted above.     Consults:   8685 I spoke with Dr. Ansari of the hospitalist service from [facility] regarding patient's presentation, findings, and plan of care.    Interventions:  1032 Uroxatral, 10 mg, oral    Disposition:  The patient was admitted to the hospital under the care of Dr. Ansari.    Impression & Plan    Medical Decision Making:  Ad is a 76 year old man who was initially brought in by his wife 40 hours ago for depressive symptoms such as decreased oral intake.  He complained of shortness of breath and had an appropriate work-up for dyspnea before being sent to the [facility] unit.  There, he struggled with urinary retention and required Chirinos catheter placement.  It was felt he needed a mental health admission for depression but ultimately this could not be achieved reportedly due to his Chirinos catheter.  He was sent back to the emergency department for reevaluation.  I have reviewed the notes from the last 40 hours with several indicating he has taken very little PO.  It appears he is becoming more confused and less cooperative the longer he is in the emergency department.  On my interview he seems confused and is unable to provide any meaningful history.  He spends a majority of the time asking when his hat and jacket will be returned to him.  His exam is unremarkable with Chirinos catheter draining medium yellow urine.  I repeated BMP and CBC which are unchanged and reassuring.  I reviewed the remainder of his recent  work up including urinalysis without UTI and normal TSH.  I completed head CT which reveals no intracranial hemorrhage or large mass.    Although both medical work-ups that have been completed are reassuring, this man is clearly inappropriate for discharge and would not be able to care for himself.  I agree he is also inappropriate for inpatient psychiatry and requires hospitalization for further investigation into the cause of his confusion.  I discussed his case with Dr. Ansari, hospitalist, who accepts admission and has no further orders.    Covid-19  Ad Bryan was evaluated during a global COVID-19 pandemic, which necessitated consideration that the patient might be at risk for infection with the SARS-CoV-2 virus that causes COVID-19.   Applicable protocols for evaluation were followed during the patient's care.   COVID-19 was considered as part of the patient's evaluation. The plan for testing is:  a test was obtained during this visit.    Diagnosis:    ICD-10-CM    1. Confusion  R41.0    2. Major depression  F32.9    3. Urinary retention  R33.9        Scribe Disclosure:  I, Justin Craig, am serving as a scribe at 9:50 AM on 10/24/2021 to document services personally performed by Otilia Song MD based on my observations and the provider's statements to me.      Otilia Song MD  10/24/21 [id number]

## 2021-10-24 NOTE — ED NOTES
Ambulatory with stable gait over to staff desk.  Patient spitting copious sputum into the garbage behind the desk.  Patient asking where is wife is and where his clothes are.  Patient confused to current plan of care and discussed that he has medications ordered that can help and patient refused.  Also instructed patient his wife is at home and his clothes are in the locked area.  Escorted patient back to chair and vital signs obtained.  400cc dark thong urine from leg bag emptied.

## 2021-10-24 NOTE — ED NOTES
Pt currently resting, writer checked with provider to see if CT could be rescheduled until tomorrow and it was confirmed that it could be put off until the morning as long as he is staying overnight. Writer informed provider of pt refusing medications. Pt only drank a few sips of liquid throughout shift.

## 2021-10-24 NOTE — PROGRESS NOTES
"Pt has refused to go for his head CT today, \"I'm done, no more of this.\" Unable to get him up to w/c. He sits in recliner and closes his eyes saying no. Phoned wife to update on pt status and she is wanting to speak with him to see if she can talk him to going to CT. While pt was on the phone with his wife, he handed the  over and started to lean back , he would have fallen if x4 staff not next him to hold him up and bring a chair behind him. It took much coaxing to get him to sit in the chair, he was standing but resistive and leaning back, staff was holding and breaking his fall before he finally sat in the chair. He did hit out at staff and push. He was eventually able to get in his recliner. He continues to refuse to eat, drink, or take meds. He is getting more confused and resistive with staff. He denies pian, SI.HI, or hallucinations. Writer planned to remove his leg bag this AM, but he  is not cooperating at this time. Wife reports he has had urinary retention for years and would like us to attempt to remove it, pt also pulls at it and wants it out. Per wife, he has and elective prostate procedure set up for this Thursday. Unable to get him to ambulate to BR at  this time,  and set up of unit does not allow to do at his chair at this time. Charge RN will phone ED about possible transfer back to his health status and for his safety.   "

## 2021-10-24 NOTE — ED NOTES
Bed: ED18  Expected date:   Expected time:   Means of arrival:   Comments:  Hold for returning [facility] pt

## 2021-10-24 NOTE — TELEPHONE ENCOUNTER
R:   [facility] and [facility] at capacity. 0041 - Discussed w/ [facility] provider who reports that unit cannot accommodate catheters. Bed search of geriatric beds:  [facility]: Not posting bed availability  [facility]: Not posting bed availability  [facility]: Not posting bed availability  [facility]: Not posting bed availability  [facility]: Not posting bed availability  [facility]: Posting beds and the following: Low acuity referrals at this time. Low violence risk without physical aggression. Must be independent with ADLs - meets exclusionary  [facility]: Posting beds and the following: Need a negative COVID test Low acuity referrals only. We are a mixed unit with adolescents and adults. Must be independent with ADLs and be able to participate in programming. Meets exclusionary  [facility]: Not posting bed availability  [facility]: Not posting bed availability  [facility]: Not posting bed availability  [facility]: Not posting bed availability  [facility]: Not posting bed availability  [facility]: Low acuity. Mixed unit.     Pt remains on work list until appropriate placement is available

## 2021-10-24 NOTE — TELEPHONE ENCOUNTER
R:  Intake called [facility] @ 8am/.  Pt has a catheter in and unable;e to care for it himself.  Nurse reports ptr will be having a procedure on Thursday.    No Mental Health beds with [facility] or within state that will care for pt with catheter.  Nurse informed pt being taken off the worklist.  After procedure, if pt can 1. Either care for cath by self and/or 2. catheter is d/delbert - Pt can be re-assessed by dac, and if bed needed they can call intake with new referral.       Pt taken opff worklist 10/24 @ 8:03am

## 2021-10-24 NOTE — ED NOTES
Patient ambulatory in department and spit on the floor.  Is confused stating that he needs to go where he was last night to meet with his family.  Encouraged patient to return to his chair and rest.  Patient compliant.

## 2021-10-24 NOTE — H&P
[facility]    History and Physical  Hospitalist       Date of Admission:  10/22/2021    Assessment & Plan   Ad Bryan is a 76 year old male who presents with suicidal ideation, depression, admitted for poor intake and urinary retention    Suicidal ideation  Depression  Cognitive impairment, likely secondary to severe depression  He reports sense that all of his medical issues are overwhelming (rhinitis, dry mouth, prostate). He denies suicidal thoughts to me (but his wife recalls exact statements and ED/empath RN report some suicidal thoughts as well). He has been more restless, reports lack of enjoyment in things he used to enjoy--he can't do them anymore, poor appetite, no concentration, forgetfulness. He has no history of cognitive impairment prior to this episode. CT head: no stroke, noted chronic microvascular ischemic changes. PTA lexapro discontinued by empath unit.  -obs  - suicide precautions  -bedside attendant  - Psychiatry consult  - continue remeron 15mg at bedtime  - klonopin TID PRN    Urinary retention  BPH  Longstanding issue. Has appt for surgery on 10/28 with [place] per patient and his wife. Issues with retention in empath unit and catheter placed  - Continue catheter for now, this will be an issue on discharge to psychiatric facility, so may need to void trial here in a day or two (did miss few doses of alfuzosin, so once continued intake of this med should improve for cath removal)  - continue PTA alfuzosin    Poor intake  Suspect related to depression as above. He reports weight loss from base weight of 176, and that he is thinner than he has ever been  - 1L IVF  - regular diet without lactose (lactose intolerant)  - nutrition consulted  - remeron as above    Recent DVT  Superficial venous clot of RLE and DVT within left calf. Not started on blood thinners given plan for upcoming urologic surgery. CT scan PE protocol, without PE.  - continue to monitor, start  lovenox 40mg daily for now given unsure how long he will be hospitalized    Constipation  Usually has BM every 2 days, no BM at least in last 3-4 days but poor intake  - 1L IVF as above  - PRN senna-docusate and miralax available    Anemia  Suspect Hgb 11.8 closer to his baseline. Hgb 13.1 likely hemo-concentrated. No signs active bleeding. Urine in mora is clear. Hgb was 13.8 in March 2019  - monitor occasionally    Allergic rhinitis  Resumed PTA nasal spray    COVID-19 screening, negative  He is fully vaccinated    Clinically Significant Risk Factors Present on Admission              # Platelet Defect: home medication list includes an antiplatelet medication        DVT Prophylaxis: lovenox  Code Status: Full Code  Expected discharge:1-3 days pending psychiatry evaluation and possible placement    Juliana Ansari DO    Primary Care Physician   Jem Dee    Chief Complaint   Suicidal ideation, depression    History is obtained from the patient, patient's wife    History of Present Illness   Ad Bryan is a 76 year old male who presented to ED on 10/22/21. There he was evaluated for suicidal ideation and shortness of breath. Patient had been making suicidal statements to family and had decreased intake (fluids and food) last few days. He had mentioned he wanted to give up and end his life. He had stopped taking medications and family had hid objects and medications that he might use to harm himself. He was brought to ED where he underwent imaging studies to work-up SOB, no findings found. [facility] and had mora catheter placed. Due to ligature risk of mora, he could not go to inpatient psych facility. Wife at bedside. States today he is better than he has been. He denies suicidal ideation, believes he is here for a lot of little things (allergies, spots on skin, peeing). He reports lack of interest in activities he used to enjoy, poor appetite. Unable to pin down what  his sleep habits have been, as he is sometimes tangential in conversation. He reports no BM for last few days. He had water and a yogurt today. No chest pain, shortness of breath, nausea, vomiting, fevers, chills at this time.    Past Medical History    I have reviewed this patient's medical history and updated it with pertinent information if needed.   Past Medical History:   Diagnosis Date     JOINT PAIN-SHLDER 9/6/2007     MIXED HYPERLIPIDEMIA 9/6/2007   Dry mouth  BPH  Allergic rhinitis    Past Surgical History   I have reviewed this patient's surgical history and updated it with pertinent information if needed.  Past Surgical History:   Procedure Laterality Date     COLONOSCOPY  2007    [facility]     HC REPAIR SLIDING INGUINAL HERNIA       HEMORRHOIDECTOMY       ROTATOR CUFF REPAIR RT/LT Right 03/2017    in [place]       Prior to Admission Medications   Prior to Admission Medications   Prescriptions Last Dose Informant Patient Reported? Taking?   Multiple Vitamin (MULTIVITAMIN OR) Unknown at Unknown time  Yes Yes   Sig: Take 1 tablet by mouth daily    Probiotic Product (PROBIOTIC PO) Unknown at Unknown time  Yes Yes   Sig: Take 1 tablet by mouth daily    alfuzosin ER (UROXATRAL) 10 MG 24 hr tablet Unknown at Unknown time Pharmacy Yes Yes   Sig: Take 10 mg by mouth daily   aspirin 81 MG EC tablet Unknown at Unknown time  Yes Yes   Sig: Take 81 mg by mouth daily.   atorvastatin (LIPITOR) 10 MG tablet Unknown at Unknown time Pharmacy Yes Yes   Sig: Take 10 mg by mouth daily.   brinzolamide-brimonidine (SIMBRINZA) 1-0.2 % ophthalmic suspension Unknown at Unknown time Pharmacy Yes Yes   Sig: Place 1 drop into both eyes 2 times daily   ciclopirox (PENLAC) 8 % external solution Unknown at Unknown time Pharmacy Yes Yes   Sig: Apply to adjacent skin and affected nails daily.  Remove with alcohol every 7 days, then repeat.   clonazePAM (KLONOPIN) 0.5 MG tablet Unknown at Unknown time Pharmacy Yes Yes   Sig: Take 0.5 mg by  mouth 3 times daily as needed for anxiety   escitalopram (LEXAPRO) 10 MG tablet Unknown at Unknown time Pharmacy Yes Yes   Sig: Take 10 mg by mouth daily   ipratropium (ATROVENT) 0.03 % nasal spray Unknown at Unknown time Pharmacy Yes Yes   Sig: [facility] 2 sprays into both nostrils 3 times daily   latanoprost (XALATAN) 0.005 % ophthalmic solution Unknown at Unknown time Pharmacy Yes Yes   Sig: Place 1 drop into both eyes daily   nystatin (MYCOSTATIN) 735569 UNIT/ML suspension Unknown at Unknown time Pharmacy Yes Yes   Sig: Take 500,000 Units by mouth 4 times daily   omega-3 acid ethyl esters (LOVAZA) 1 G capsule Unknown at Unknown time  Yes Yes   Sig: Take 2 g by mouth 2 times daily   tadalafil (CIALIS) 5 MG tablet Unknown at Unknown time Pharmacy Yes Yes   Sig: Take 5 mg by mouth daily as needed      Facility-Administered Medications: None     Allergies   Allergies   Allergen Reactions     Simvastatin      Muscle ache       Social History   I have reviewed this patient's social history and updated it with pertinent information if needed. Ad Bryan  reports that he has never smoked. He has never used smokeless tobacco. He reports current alcohol use. He reports that he does not use drugs.    Family History   I have reviewed this patient's family history and updated it with pertinent information if needed.   Family History   Problem Relation Age of Onset     Respiratory Mother      Hypertension Father      Breast Cancer Sister      Breast Cancer Sister      Breast Cancer Sister        Review of Systems   The 10 point Review of Systems is negative other than noted in the HPI    Physical Exam   Temp: 97.9  F (36.6  C) Temp src: Oral BP: (!) 159/80 Pulse: 58   Resp: 18 SpO2: 100 % O2 Device: None (Room air)    Vital Signs with Ranges  Temp:  [97.9  F (36.6  C)] 97.9  F (36.6  C)  Pulse:  [58-70] 58  Resp:  [14-18] 18  BP: (138-159)/(76-80) 159/80  SpO2:  [100 %] 100 %  160 lbs 0 oz    Constitutional: Awake, alert,  cooperative, no apparent distress, thin  Eyes: Conjunctiva and pupils examined and normal.  HEENT: Moist mucous membranes, normal dentition.  Respiratory: Clear to auscultation bilaterally, no crackles or wheezing.  Cardiovascular: Regular rate and rhythm, normal S1 and S2, and no murmur noted.  GI: Soft, non-distended, non-tender, normal bowel sounds.  Lymph/Hematologic: No anterior cervical or supraclavicular adenopathy.  Skin: No rashes, no cyanosis, trace edema LLE, otherwise no edema on right.  Musculoskeletal: No joint swelling, erythema or tenderness. Leg bag on left leg.  Neurologic: Cranial nerves 2-12 intact, normal strength and sensation.  Psychiatric: Alert, oriented to person, place and time, flat affect. Denies suicidal or homicidal thoughts    Data   Data reviewed today:  I personally reviewed EKG: sinus with PACs. CT Chest PE: no PE. CT head: no stroke, noted chronic microvascular ischemic changes  Recent Labs   Lab 10/24/21  1023 10/22/21  2019   WBC 4.2 3.6*   HGB 13.1* 11.8*   MCV 96 96    172    140   POTASSIUM 3.8 3.9   CHLORIDE 107 108   CO2 25 27   BUN 18 19   CR 1.02 1.08   ANIONGAP 7 5   POOL 9.0 9.1   GLC 93 100*   TROPONIN  --  <0.015       Recent Results (from the past 24 hour(s))   Head CT w/o contrast    Narrative    EXAM: CT HEAD W/O CONTRAST  LOCATION: [facility]  DATE/TIME: 10/24/2021 12:14 PM    INDICATION: Mental status change, unknown cause.  COMPARISON: None.  TECHNIQUE: Routine CT Head without IV contrast. Multiplanar reformats. Dose reduction techniques were used.    FINDINGS:  INTRACRANIAL CONTENTS: No intracranial hemorrhage, extraaxial collection, or mass effect.  No CT evidence of acute infarct. Mild to moderate presumed chronic small vessel ischemic changes. Mild generalized volume loss. No hydrocephalus.     VISUALIZED ORBITS/SINUSES/MASTOIDS: Prior bilateral cataract surgery. Visualized portions of the orbits are otherwise  unremarkable. No paranasal sinus mucosal disease. No middle ear or mastoid effusion.    BONES/SOFT TISSUES: No acute abnormality.      Impression    IMPRESSION:  1.  No CT evidence for acute intracranial abnormality.  2.  Volume loss and presumed chronic microvascular ischemic changes as above.

## 2021-10-24 NOTE — PROGRESS NOTES
Added note: Central Intake called this AM, pt was removed from Mental health inpatient waiting list. [facility] will not accept at this time and recommend medical floor due to his medical needs and inability to care for self.

## 2021-10-25 PROBLEM — R41.89 COGNITIVE IMPAIRMENT: Status: ACTIVE | Noted: 2021-01-01

## 2021-10-25 PROBLEM — R41.0 CONFUSION: Status: RESOLVED | Noted: 2021-01-01 | Resolved: 2021-01-01

## 2021-10-25 PROBLEM — F32.A DEPRESSION, UNSPECIFIED DEPRESSION TYPE: Status: RESOLVED | Noted: 2021-01-01 | Resolved: 2021-01-01

## 2021-10-25 PROBLEM — R45.851 SUICIDAL IDEATIONS: Status: ACTIVE | Noted: 2021-01-01

## 2021-10-25 PROBLEM — R06.02 SHORTNESS OF BREATH: Status: RESOLVED | Noted: 2021-01-01 | Resolved: 2021-01-01

## 2021-10-25 PROBLEM — R33.9 URINARY RETENTION: Status: ACTIVE | Noted: 2021-01-01

## 2021-10-25 PROBLEM — R45.851 SUICIDAL IDEATIONS: Status: RESOLVED | Noted: 2021-01-01 | Resolved: 2021-01-01

## 2021-10-25 NOTE — PROGRESS NOTES
Xcoverage: paged by RN because the patient is becoming more confused and restless; Zyprexa 5 mg at bedtime prn ordered.    Miriam Sebastian MD

## 2021-10-25 NOTE — PLAN OF CARE
Pt  restless, was able to sleep overnight, sitter at bedside. BP soft, RA, denies pain. Up with SBA.LS diminished. Zyprexa not given.On regular diet, wife at bedside.    0400: Code 21 called for increasing agitation. Pt confused, trying to pull the mora out and trying to kick staff. Zyprexa po given. Pt was able to fall asleep.    0600: Pt calm, alert to month, year and self. Easily redirectable, still confused.

## 2021-10-25 NOTE — CODE/RAPID RESPONSE
Brief house NP note:    Code 21 paged for increasing agitation. Patient was appropriate on my exam, confused and disoriented, wanting to leave. Wife is at bedside.     PLAN:  -- 5 mg ODT zyprexa scheduled BID  -- wife endorses patient became more confused after evening remeron and klonopin dosing.   -- no need for restraints at this time    Please page with additional concerns,    JONATHON Lawton, CNP  Hospitalist - House [facility]  Text Page  [phone])

## 2021-10-25 NOTE — CONSULTS
CLINICAL NUTRITION SERVICES - BRIEF NOTE    Consult received for malnutrition  Pt is currently Observation Status & a full Nutrition Assessment will be deferred at this time per protocol. Pt can be referred to RD by primary care provider after discharge as appropriate.      Chart reviewed:  Code 21 was called this AM for confusion and agitation  Per H&P, poor oral intake suspected r/t depression and reported wt loss (UBW estimated ~176 lbs). Noted to be lactose intolerant   Will order Ensure Enlive BID for supplementation    Should patient's status change to Inpatient, we will be available for full Nutrition Assessment (Please reconsult in this case).     Julia Ayala RD, LD  Clinical Dietitian   Unit pager [phone]

## 2021-10-25 NOTE — PROGRESS NOTES
"PRIMARY DIAGNOSIS: \"GENERIC\" NURSING  OUTPATIENT/OBSERVATION GOALS TO BE MET BEFORE DISCHARGE:  1. ADLs back to baseline: Yes     2. Activity and level of assistance: Up with standby assistance.     3. Pain status: Pain free.     4. Return to near baseline physical activity: Yes - sitter at bedside - pt states not hopeless this AM and does not want to hurt self             Discharge Planner Nurse      Safe discharge environment identified: NO  Barriers to discharge: Yes - mora in place / memory care home does not take        Entered by: Asia Loaiza 10/25/2021 12:08 PM        Please review provider order for any additional goals.   Nurse to notify provider when observation goals have been met and patient is ready for discharge.  "

## 2021-10-25 NOTE — UTILIZATION REVIEW
Admission Status; Secondary Review Determination     Admission Date: 10/22/2021  6:30 PM       Under the authority of the Utilization Management Committee, the utilization review process indicated a secondary review on the above patient.  The review outcome is based on review of the medical records, discussions with staff, and applying clinical experience noted on the date of the review.        (x)      Inpatient Status Appropriate - This patient's medical care is consistent with medical management for inpatient care and reasonable inpatient medical practice.       RATIONALE FOR DETERMINATION      Brief clinical presentation, information copied from the chart, abbreviated and edited for relevant content:     Initially started OBS, change today by Dr. Thao to inpatient. Discussed case. Several issues going on, not just disposition related, not discharging today.     Ad Bryan is a 76 year old male who presented with suicidal ideation, depression, admitted for poor intake and urinary retention. Noted to have suicidal ideation and   Cognitive impairment, likely secondary to severe depression. He reported he feels all of his medical issues are overwhelming . He has been more restless, reports lack of enjoyment in things he used to enjoy--he can't do them anymore, poor appetite, no concentration, forgetfulness.  CT head: no stroke, noted chronic microvascular ischemic changes. Initially started on OBS with  Psychiatry consult and medication management with remeron 15mg at bedtime and klonopin TID PRN.  Also with Urinary retention with history of BPH. Has appt for surgery on 10/28 with [facility] per patient and his wife. Issues with retention in empath unit and catheter placed  Continue catheter for now -placed in ER, this will be an issue on discharge to psychiatric facility, so may need to void trial here in a day or two (did miss few doses of alfuzosin, so once continued intake of this med should improve for cath  removal) Also with Poor intake  Suspect related to depression as above. He reports weight loss from base weight of 176, and that he is thinner than he has ever been. On IVF pending adequate oral, nutrition consult.  Also with Recent DVT -Superficial venous clot of RLE and DVT within left calf. Not started on blood thinners given plan for upcoming urologic surgery. CT scan PE protocol, without PE. Plan to  start lovenox 40mg daily for now given unsure how long he will be hospitalized            At the time of admission with the information available to the attending physician, more than 2 nights hospital complex care was anticipated. Also, there was a risk of adverse outcome if patient was treated outpatient or observation. High intensity of services anticipated. Inpatient admission appropriate based on Medicare guidelines.       The information on this document is developed by the utilization review team in order for the business office to ensure compliance.  This only denotes the appropriateness of proper admission status and does not reflect the quality of care rendered.         The definitions of Inpatient Status and Observation Status used in making the determination above are those provided in the CMS Coverage Manual, Chapter 1 and Chapter 6, section 70.4.      Sincerely,      Zaria Ramirez MD   Utilization Review/ Case Management  [facility].

## 2021-10-25 NOTE — PLAN OF CARE
Pt Alert to time and place. Mentation waxes and wanes from baseline per wife. No complaints of pain. Sitter at the bedside. Tolerating diet. Still awaiting psych consult. Chirinos in place. No BM this shift. Daughter in law to spend the night with pt.

## 2021-10-25 NOTE — PLAN OF CARE
A/O to self, pt now showing some signs of confusion, restless, not able to stay in bed VSS on room air,denies pain. SBA, suicide precautions, has a sitter,  has not shown any signs of aggression, uncooperative intermittently, needs encouragement with cares,     provider updated with above findings, plan is to start on Zyprexa

## 2021-10-25 NOTE — PROGRESS NOTES
"[facility]    Hospitalist Progress Note    Assessment & Plan   76 year old male who was admitted on 10/22/2021 with confusion and agitation, and suicidal comments.      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 Nasim Redman at [facility] this Thurs 10/28/21, left message with his office -- anticipate he will still be hospitalized       Left leg below knee DVT -- seen on US at [facility] this past month, treated with compression stockings   -- on Lovenox 40 mg subcutaneous   -- will get bilateral leg US tomorrow to access whether progressing       Plan:  As above, discussed with wife, will update once seen by Psychiatry.     DVT Prophylaxis: Pneumatic Compression Devices  Code Status: Full Code    Disposition: Expected discharge in several days (once confusion cleared and no longer suicidal)    Les Hoang  Pager [phone]  Cell Phone [phone]  Text Page (7am to 6pm)  (35 min total)    Interval History   He does not know why he is here, when asked if suicidal he said no " "and is smiling.  Agitated last night, got clonazepam for suspected anxiety and then more confusion.   Continues to ask to urinate but has mora with leg bag.  Wife at bedside, covers for his lack of ability to answer questions -- says he used to read the paper every day, but stopped in Jan 2021, and quit working then (was on [employer] but not able to manage the Zoom conferences -- couldn't work it).    Physical Exam   Temp: 98.3  F (36.8  C) Temp src: Axillary BP: 94/57 Pulse: 72   Resp: 16 SpO2: 100 % O2 Device: None (Room air)    Vitals:    10/22/21 1847 10/25/21 0500   Weight: 72.6 kg (160 lb) 68.9 kg (151 lb 14.4 oz)     Vital Signs with Ranges  Temp:  [97.3  F (36.3  C)-98.6  F (37  C)] 98.3  F (36.8  C)  Pulse:  [67-78] 72  Resp:  [15-18] 16  BP: ()/(42-81) 94/57  SpO2:  [98 %-100 %] 100 %  I/O last 3 completed shifts:  In: 1000 [IV Piggyback:1000]  Out: 1925 [Urine:1925]    # Pain Assessment:  Current Pain Score 10/25/2021   Patient currently in pain? denies   rFLACC pain score -   Ad mccloud pain level was assessed and he currently denies pain.        Constitutional: Awake, alert, cooperative, no apparent distress  Respiratory: Clear to auscultation bilaterally, no crackles or wheezing  Cardiovascular: Regular rate and rhythm, normal S1 and S2, and no murmur noted  GI: Normal bowel sounds, soft, non-distended, non-tender  Extrem: No calf tenderness, no ankle edema  Neuro: alert when talking to him, date [birth year], 1921, could not name this place and did not know who the president was but was able to say \"Biden\" when given the hint \"Mario\", no focal motor or sensory deficits    Medications       alfuzosin ER  10 mg Oral Daily     brinzolamide-brimonidine  1 drop Both Eyes BID     enoxaparin ANTICOAGULANT  40 mg Subcutaneous Q24H     ipratropium  2 spray Both Nostrils TID     latanoprost  1 drop Both Eyes QPM     mirtazapine  15 mg Orally disintegrating tablet At Bedtime       Data   Recent Labs   Lab " 10/24/21  1023 10/22/21  2019   WBC 4.2 3.6*   HGB 13.1* 11.8*   MCV 96 96    172    140   POTASSIUM 3.8 3.9   CHLORIDE 107 108   CO2 25 27   BUN 18 19   CR 1.02 1.08   ANIONGAP 7 5   POOL 9.0 9.1   GLC 93 100*   ALBUMIN 3.9  --    PROTTOTAL 7.7  --    BILITOTAL 0.9  --    ALKPHOS 47  --    ALT 32  --    AST 28  --    TROPONIN  --  <0.015       Imaging:   No results found for this or any previous visit (from the past 24 hour(s)).

## 2021-10-26 PROBLEM — E44.0 MODERATE MALNUTRITION (H): Status: ACTIVE | Noted: 2021-01-01

## 2021-10-26 NOTE — PROGRESS NOTES
Pt is alert to time and place. Confused intermittently. Sitter and son at bedside. VSS on Ra, denied pain. Chirinos patent with adequate output. Denied suicidal ideation and SOB. Psych consult to done today. Plan for US of Lower extremities today. Continue to monitor.

## 2021-10-26 NOTE — CONSULTS
[facility], Consult & Liaison    Ad Bryan  October 26, 2021    Session start: 1:35 pm  Session end: 2:08 pm  Session duration in minutes: 35  CPT utilized: 88183 - Psychotherapy (with patient) - 30 (16-37*) min  Patient was seen virtually ([employer] cart or other teleconferencing device).  Anticipated number of sessions or this episode of care: 1-4    Reason for consult: Ad is 76 year old Black or   male . Psychiatry consult was requested due to depression and vague SI statements. Patient was seen by [facility] Consult & Liaison team.     Presenting problem : Pt was initially sent in via family for concerns of suicidal statements and shortness of breath. Patient on emPATH unit for a little over 24 hours before transferred to medical floor due to concern of rapid neurocognitive decline and need to rule out medical causes as well as concern for caring for mora catheter as patient not voiding on his own.He reports sense that all of his medical issues are overwhelming (rhinitis, dry mouth, prostate). Patient expresses anxiety regarding COVID, It appears patient has been isolating more as well as having some obsessive thoughts about illnesses due to COVID. Patient reports he thinks about death/dying often and that his illnesses may be terminal. Patient states he does not want to die, but worries that he is dying. Wife describes that patient has been more cautious about using bathroom due to germs, so he will avoid drinking/eating. He has been more restless, reports lack of enjoyment in things he used to enjoy (reading newspaper, going to visit people), poor appetite, no concentration, forgetfulness. He has no history of cognitive impairment prior to this episode. CT of  Head showed no stroke. Patient also reports problems with sleep, however, indicates that is likely due to him ahvin to go to bathroom several times throughout the night, reports easily able to fall back asleep once back in bed.    Patient apparently has been eating better over the last 24 hours, yesterday ate most of his meals. Patient has not had period of agitation or need for CODE 21 in past 24 hours. At times in talking with patient today, appears confused or answers questions inappropriately. Has difficulty answering at times and wife answers for him.     Mental Status Exam   Affect: Flat  Appearance: Appropriate   Attention Span/Concentration: Attentive    Eye Contact: Variable  Fund of Knowledge: Delayed   Language /Speech Content: Fluent  Language /Speech Volume: Soft   Language /Speech Rate/Productions: Normal   Recent Memory: Poor  Remote Memory: Poor  Mood: Normal   Orientation:   Person: Yes   Place: No  Time of Day: No   Date: No   Situation (Do they understand why they are here?): No and Answer: has difficult time providing answer    Psychomotor Behavior: Normal   Thought Content: Clear  Thought Form: Loose Associations    Current medications:   Discontinued Lorazepam due to concern about worsening confusion.   Started Mirtazapine 15 mg in evening. Started two nights ago.       Other meds:   Current Facility-Administered Medications   Medication     acetaminophen (TYLENOL) tablet 650 mg    Or     acetaminophen (TYLENOL) Suppository 650 mg     alfuzosin ER (UROXATRAL) 24 hr tablet 10 mg     brinzolamide-brimonidine (SIMBRINZA) 1-0.2 % ophthalmic suspension 1 drop     enoxaparin ANTICOAGULANT (LOVENOX) injection 40 mg     ipratropium (ATROVENT) 0.03 % spray 2 spray     latanoprost (XALATAN) 0.005 % ophthalmic solution 1 drop     melatonin tablet 3 mg     mirtazapine (REMERON SOL-TAB) ODT tab 15 mg     multivitamin w/minerals (THERA-VIT-M) tablet 1 tablet     OLANZapine zydis (zyPREXA) ODT tab 5 mg     ondansetron (ZOFRAN-ODT) ODT tab 4 mg    Or     ondansetron (ZOFRAN) injection 4 mg     polyethylene glycol (MIRALAX) Packet 17 g     senna-docusate (SENOKOT-S/PERICOLACE) 8.6-50 MG per tablet 1 tablet    Or     senna-docusate  (SENOKOT-S/PERICOLACE) 8.6-50 MG per tablet 2 tablet     traZODone (DESYREL) tablet 50 mg          Therapeutic intervention and progress:  Therapeutic intervention consisted of building therapeutic rapport, active listening, validation and normalizing. Patient is making progress towards treatment goals as evidenced by improving appetite, denying SI today, able to engage in conversation.     Collateral information:   Reviewed chart and coordinated with wife during meeting.     Diagnosis:   Major Depressive Disorder F32.1  Rule out generalized anxiety disorder     Plan:     Appears patient would be appropriate for ashish-psych once all medical concerns have been addressed. Patient currently not eligible for [place] ash[facility]-psych unit due to neurocog concerns and catheter, possibly recommend OT eval for cognitive assessment/defecits. Placement may also be difficult due to catheter. Recommend consulting social work to work on placement for [facility]-psych (possibly [place] or [place]).     Concern that anxiety/ possible obsessive/compulsive symptoms regarding his health/COVID may be impacting his ability to engage in ADLs and things he previously enjoyed (eat, drink, use bathroom, spend time with others, reading newspaper- reports news is negative. Discussed with patient and wife plan to take care of himself, including reading books instead of newspaper, eating as able and asking questions if concerned about food he is eating, attempting to spend time with others as comfortable (we discussed asking ahead of time COVID status to ease comfortability). Recommend to offer prescribed Zyprexa.     Consulted psychiatry regarding any med changes. Dr. Roberts reported that Mirtazapine could take time to gain therapeutic effect and to continue on current dose for now.     Patient will not continue to be followed by this service. Re-consult as needed.      America Horton, [facility], Consult & Liaison  Service  [phone]

## 2021-10-26 NOTE — CONSULTS
"CLINICAL NUTRITION SERVICES  -  ASSESSMENT NOTE    Recommendations Ordered by Registered Dietitian (RD):   - Continue Ensure Enlive BID  - Thera vit M daily       -? Need for bowel meds. No BM recorded this admission.    Malnutrition:   % Weight Loss:  Weight loss does not meet criteria for malnutrition   % Intake:  </= 75% for >/= 1 month (severe malnutrition)  Subcutaneous Fat Loss:  Orbital region mild-moderate depletion  Muscle Loss:  Temporal region mild-moderate depletion  Fluid Retention:  None noted    Malnutrition Diagnosis: Moderate malnutrition  In Context of:  Chronic illness or disease     REASON FOR ASSESSMENT  Ad Bryan is a 76 year old male seen by Registered Dietitian for Provider Order - \"malnutrition\"     NUTRITION HISTORY  - Information obtained from chart, patient's son at bedside. Pt was resting at time of visit.   - Presented for evaluation of suicidal ideation, shortness of breath.   - Has had poor oral intake at home. Noted to have lost >20 lbs in 1-2 years.     - Patient's son reports that Michael's intake has been low for quite some time, due to dry mouth, runny nose, and an enlarged prostate that caused him to urinate frequently. He DID have an appetite, no nausea/vomiting/abdominal pain reported. Patient avoided eating and drinking much in order to reduce frequency of bathroom visits.   - Likes Evian water.   - Weight was down about 10#, then back up about 5-6#. Son unable to weigh in on more specific weight changes.   - NKFA      CURRENT NUTRITION ORDERS  Diet Order:     Regular   Ensure Enlive between meals (lactose-free supplement)     Current Intake/Tolerance:  Has been eating quite well the past 1-2 days. % of meals recorded for three large meals yesterday. Today's breakfast is on the way, son hopes that he will again eat well. Ensure at bedside, pt has not yet consumed any of this.     NUTRITION FOCUSED PHYSICAL ASSESSMENT FOR DIAGNOSING MALNUTRITION)            Observed:  " "  Face only- pt covered by blankets, was asked by RN not to disturb him. (fat muscle wasting in orbital, temporal regions)    Obtained from Chart/Interdisciplinary Team:  Benedict - Nutrition 3; Total 18  No BMs recorded this admission     ANTHROPOMETRICS  Height: 6' 0\"  Weight: 151 lbs 14.4 oz (68.9 kg)   Body mass index is 20.6 kg/m .  Weight Status:  Normal BMI  IBW: 80.9 kg   % IBW: 85%  Weight History: ~8# loss in the past 4 months (5%).   Wt Readings from Last 10 Encounters:   10/25/21 68.9 kg (151 lb 14.4 oz)   12/13/17 82.1 kg (181 lb)   11/02/17 82.1 kg (181 lb)     06/10/21 72.2 kg (159 lb 2.8 oz)   03/31/19 78.7 kg (173 lb 8 oz)    LABS  Labs reviewed    MEDICATIONS  Medications reviewed  Remeron 15 mg    ASSESSED NUTRITION NEEDS PER APPROVED PRACTICE GUIDELINES:  Dosing Weight 68.9 kg   Estimated Energy Needs: 2529-1494 kcals (25-30 Kcal/Kg)  Justification: maintenance and repletion  Estimated Protein Needs:  grams protein (1.2-1.5 g pro/Kg)  Justification: preservation of lean body mass  Estimated Fluid Needs: 1 mL/kcal  Justification: maintenance    MALNUTRITION:  % Weight Loss:  Weight loss does not meet criteria for malnutrition   % Intake:  </= 75% for >/= 1 month (severe malnutrition)  Subcutaneous Fat Loss:  Orbital region mild-moderate depletion  Muscle Loss:  Temporal region mild-moderate depletion  Fluid Retention:  None noted    Malnutrition Diagnosis: Moderate malnutrition  In Context of:  Chronic illness or disease    NUTRITION DIAGNOSIS:  Inadequate oral intake related to declination to eat due to dry mouth as evidenced by reported poor PO intakes at home for several months, and up to 20# unintended weight loss in 1-2 years.       NUTRITION INTERVENTIONS  Recommendations / Nutrition Prescription  Regular diet  Ensure Enlive BID between meals  Thera vit M daily       Implementation  Nutrition education: Provided education on supplements, Remeron  Medical Food Supplement and " Multivitamin/Mineral: ordered      Nutrition Goals  Intake of at least 75% meals BID- TID +1 supplement.       MONITORING AND EVALUATION:  Progress towards goals will be monitored and evaluated per protocol and Practice Guidelines    Flakita Lantigua RD, LD  [facility] [place], [facility], Ortho, [facility]  Pager: [phone]  Weekend Pager: [phone]

## 2021-10-26 NOTE — PLAN OF CARE
VSS, flat affect, mumbled speech. Sentences do not always match situation. Some paranoia regarding potential poison in foods.  Hesitant to trust staff, family at bedside works with him more easily. Remains on suicide precautions-unable to remove all potential risks in inpatient room, sitter at bedside.

## 2021-10-26 NOTE — PROGRESS NOTES
"[facility]    Hospitalist Progress Note    Assessment & Plan   76 year old male who was admitted on 10/22/2021 with confusion and agitation, and suicidal comments.      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 Nasim Redman at [facility] this Thurs 10/28/21, left message with his office -- anticipate he will still be hospitalized    -- will try to remove mora in AM if mentally improving       Left leg below knee DVT -- seen on US at [facility] this past month, treated with compression stockings   -- on Lovenox 40 mg subcutaneous   -- repeat bilat leg US today with no evidence of DVT       Moderate Malnutrition -- 2nd to depression (per dietician)    Plan:  As above, discussed with wife, will update once seen by Psychiatry.     DVT Prophylaxis: Pneumatic Compression Devices  Code Status: Full Code    Disposition: Medically stable to discharge to Geriatric Psych unit once bed available.  Discussed with son and wife.     Les Hoang  Pager [phone]  Cell Phone [phone]  Text Page (7am to " "6pm)    Interval History   Slept better last night, but did wake up at 4 AM and was up for an hour then back to sleep.  Thirsty this AM, but refusing to drink -- doesn't think the juice an water are \"safe\".  Afraid to go in bathroom, he is not sure if it is clean.     Physical Exam   Temp: 98  F (36.7  C) Temp src: Oral BP: 105/75 Pulse: 98   Resp: 16 SpO2: 99 % O2 Device: None (Room air)    Vitals:    10/22/21 1847 10/25/21 0500   Weight: 72.6 kg (160 lb) 68.9 kg (151 lb 14.4 oz)     Vital Signs with Ranges  Temp:  [97.1  F (36.2  C)-98  F (36.7  C)] 98  F (36.7  C)  Pulse:  [67-98] 98  Resp:  [16] 16  BP: ()/(51-75) 105/75  SpO2:  [97 %-100 %] 99 %  I/O last 3 completed shifts:  In: 1440 [P.O.:1440]  Out: 1240 [Urine:1240]    # Pain Assessment:  Current Pain Score 10/25/2021   Patient currently in pain? denies   rFLACC pain score -   Ad s pain level was assessed and he currently denies pain.        Constitutional: Awake, alert, cooperative, no apparent distress  Respiratory: Clear to auscultation bilaterally, no crackles or wheezing  Cardiovascular: Regular rate and rhythm, normal S1 and S2, and no murmur noted  GI: Normal bowel sounds, soft, non-distended, non-tender  Extrem: No calf tenderness, no ankle edema  Neuro: alert when talking to him, date Oct , 1921, today was able to say the president was Mario Soto without any hints, and no focal motor or sensory deficits    Medications       alfuzosin ER  10 mg Oral Daily     brinzolamide-brimonidine  1 drop Both Eyes BID     enoxaparin ANTICOAGULANT  40 mg Subcutaneous Q24H     ipratropium  2 spray Both Nostrils TID     latanoprost  1 drop Both Eyes QPM     mirtazapine  15 mg Orally disintegrating tablet At Bedtime     multivitamin w/minerals  1 tablet Oral Daily       Data   Recent Labs   Lab 10/26/21  0610 10/24/21  1023 10/22/21  2019   WBC 4.5 4.2 3.6*   HGB 12.9* 13.1* 11.8*   MCV 99 96 96    196 172    139 140   POTASSIUM 4.3 3.8 3.9 "   CHLORIDE 112* 107 108   CO2 26 25 27   BUN 25 18 19   CR 1.27* 1.02 1.08   ANIONGAP 5 7 5   POOL 9.0 9.0 9.1   * 93 100*   ALBUMIN  --  3.9  --    PROTTOTAL  --  7.7  --    BILITOTAL  --  0.9  --    ALKPHOS  --  47  --    ALT  --  32  --    AST  --  28  --    TROPONIN  --   --  <0.015       Imaging:   Recent Results (from the past 24 hour(s))   US Lower Extremity Venous Bilateral Port    Narrative    US LOWER EXTREMITY VENOUS DUPLEX BILATERAL PORT 10/26/2021 8:30 AM    CLINICAL HISTORY: follow up of left leg DVT  TECHNIQUE: Venous Duplex ultrasound of bilateral lower extremities  with and without compression, augmentation and duplex. Color flow and  spectral Doppler with waveform analysis performed.    COMPARISON: None.    FINDINGS: Exam includes the common femoral, femoral, popliteal veins  as well as segmentally visualized deep calf veins and greater  saphenous vein.     RIGHT: No deep vein thrombosis. No superficial thrombophlebitis. No  popliteal cyst.    LEFT: No deep vein thrombosis. No superficial thrombophlebitis. No  popliteal cyst.      Impression    IMPRESSION:  1.  No deep venous thrombosis in the bilateral lower extremities.    JAYME WEBER MD         SYSTEM ID:  [id number]

## 2021-10-27 PROBLEM — F32.3 PSYCHOTIC DEPRESSION (H): Status: ACTIVE | Noted: 2021-01-01

## 2021-10-27 PROBLEM — F32.9 MAJOR DEPRESSION: Status: RESOLVED | Noted: 2021-01-01 | Resolved: 2021-01-01

## 2021-10-27 NOTE — CONSULTS
"[facility] Extended Care, Consult & Liaison    Ad Bryan  October 27, 2021      Psychiatry consult acknowledged.     Writer's supervisor spoke to hospitalist.     Writer spoke to Mr. Bryan's spouse Tatum via her cell phone.  She reports some confusion regarding the disposition planning. She reports feeling more comfortable today with him returning home as he slept for 12 hours and he is engaging with others regarding general conversation.  Reports that she feels her spouse is stressed being here with his ongoing medical concerns along with his phobia of cleanliness of bathrooms and that it is hard on him not being active.     They saw a psychiatrist at the [facility] via telehealth on 10/6/21. Indicates that she would like to try an find a provider they are able to see in person as this provider is also being transferred to [place]. Lexapro was prescribed. His spouse report that is when his mental health started to decline and that he was no sleeping.  He would often talk about giving up and had no intent to act on his thoughts. She reports once he stated he was going to overdose and then took a bottle of Zyrtec and then put it back down, when other medications were available to him.     Indicates since the medication changes at the hospital she has noticed an improvement already. Reports \"he is sleeping better which is huge\".      She denies him having access to guns and that she will be moving medications out his immediate access.. Reports he will have access to knives but he has never talked about cutting or stabbing himself.     Verbally discussed resources and additionally supports for possible cognitive impairment. Wife is agreeable to having Writer coordinate appointments for mental health outpatient supports but still would like to look into geripsych if possible. Provided education regarding geripsych per her request.     The spouse has more questions regarding disposition that this writer is unable to " "answer. Writer spoke to RN whom will follow up with her questions.     Appointments Scheduled at this time:     [facility]   [address], [place] [address]   [phone]  Psychiatry: Funmi Blanchard   November 10th, 21 at 10:50am  Follow Up-December 8th, 2021 at 10:50am. For the first appointment please arrive 30 minutes early to complete paperwork.    CASE MANAGEMENT: PLEASE FAX discharge summary and orders when available to [phone]. Thank you!       [facility]  [id number] [place] [address], [address]  [place] [address]  Phone: [phone]  Fax: [phone]  Mental Health Counseling: Jesu العراقي   December 2nd, 2021 at 2:00pm   Please call to provide them with your e-mail for paperwork at [phone] opt 1.     Resources for after discharge if necessary include:   National Francisco on Mental Illness (LILI) [place]: Connect for help, to navigate the mental health system, and for support and for resources.  Call: 1-652-FDBH-Helps / 0-840-799-4262    Crisis Text Line: The 24/7 emergency service is available if you or someone you know is experiencing a psychiatric or mental health crisis.  Text: \"[place]\" to 319445    [place] [facility]: Are you an adult needing support? Talk to a specialist who has firsthand experience living with a mental health condition.  Call: [phone]   Text: \"Support\" to 14494    National Suicide Prevention Lifeline: The 24/7 lifeline provides support when in distress, has prevention and crisis resources for you or your loved ones, and resources for professionals.  Call 4-044-919-TALK (4074)    Peer Support Connection Warmlines: Yjhi-fj-zefg telephone support that s safe and supportive. Open 5 p.m. to 9 a.m.  Call or text: 1-908.556.3987     COVID Cares Stress Phone Support Service. Any [place] experiencing stress can call 738-FZGL5SN (495-053-1173) for free telephone support from 9am to 9pm every day. The service is a " collaboration with volunteers from the [place] Psychiatric Society, the [place] Psychological Association, the [facility] Psychologists, and Mental Health [place]. The free service is also accessible at [employer]org where searchers can also find psychiatric and mental health services availability and real-time Substance Use Disorder Treatment program openings.    Alzheimer[employer] Association of [place] and [place]  24/7 HELPLINE  [phone]    MERARY DUFF, [facility], [facility] Extended Care, Consult & Liaison Service  [phone]

## 2021-10-27 NOTE — CONSULTS
Care Management Follow Up    Length of Stay (days): 2    Expected Discharge Date: 10/27/2021     Concerns to be Addressed:       Patient plan of care discussed at interdisciplinary rounds: Yes    Anticipated Discharge Disposition:       Anticipated Discharge Services:    Anticipated Discharge DME:      Patient/family educated on Medicare website which has current facility and service quality ratings:    Education Provided on the Discharge Plan:    Patient/Family in Agreement with the Plan:      Referrals Placed by CM/SW:    Private pay costs discussed: Not applicable    Additional Information:  SW Consulted for MH/CD resources for patient for discharge to community. SW met with patient and spouse to offer resources for patient to have psychiatry in the community.  Patient and spouse deny need for CD resources.  Patient and spouse in agreement with appointment being set up for psychiatry in the community.    Addendum: Mental Health Practitioner following and assisting in setting up services for patient in the community. Please reconsult SW for any additional needs.     SKYLAR Cole

## 2021-10-27 NOTE — PROGRESS NOTES
Pt is alert to self and placed. Intermittent confused, cooperative most of the shift. Wife at bedside. Suicide precaution maintained, sitter at bedside. Chirinos patent with adequate output.Pt is transferring to station 88, report given receiving RN. Continue to monitor.

## 2021-10-27 NOTE — CONSULTS
[facility] Extended Care, Consult & Liaison    Ad Bryan  October 27, 2021      Psychiatry consult acknowledged.    Paged provider r/g consultation request regarding goals for consultation.     Consult completed yesterday with verbal report r/g  recommendation per note on 10/26/21 by America CHASE:    Consulted psychiatry regarding any med changes. Dr. Roberts reported that Mirtazapine could take time to gain therapeutic effect and to continue on current dose for now.    Awaiting return communication from provider at this time.     MERARY DUFF, KEMAR  [facility], [facility] Extended Care, Consult & Liaison Service  [phone]

## 2021-10-27 NOTE — DISCHARGE SUMMARY
[facility]    Discharge Summary  Hospitalist    Date of Admission:  10/22/2021  Date of Discharge:  10/28/2021   Discharging Provider: Les Hoang MD    Discharge Diagnoses   Principal Problem:    Suicidal ideation      Major depression with Psychotic features (delusional) -- improved       Cognitive impairment with Agitation -- cleared with treatment of depression     Active Problems:    Urinary retention -- S/P Mora 10/24/21 -- mora removed on discharge        Moderate malnutrition       Constipation       History of Present Illness   76 year old male who presented to ED on 10/22/21, and he was evaluated for suicidal ideation and shortness of breath. Patient had been making suicidal statements to family and had decreased intake (fluids and food) last few days. He had mentioned he wanted to give up and end his life. He had stopped taking medications and family had hid objects and medications that he might use to harm himself. He was brought to ED where he underwent imaging studies to work-up SOB, no findings found. He went to [facility] where he had issues urinating and had mora catheter placed, and admitted to medical floor.  He reports lack of interest in activities he used to enjoy, poor appetite. Unable to pin down what his sleep habits have been, as he is sometimes tangential in conversation. He reports no BM for last few days.    He was started on Lexapro 10 mg daily about 1 month ago at [place], and Clonazepam 0.5 mg tid prn anxiety.  Wife did contact [place] about his behavior and advised stopping Lexapro about a week prior to admission.     In ER, /97, Hgb 11.8, but BMP and CBC otherwise normal.  D-dimer 0.56 and chest CTA normal, no pulm embolus.  UA normal, and when patient unable to urinate a mora was placed with 400 ml PVR.          Hospital Course   Admitted to medicine floor, trop normal, TSH 1.28, Thiamine 96, Vit B12 755.  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  Wife said he was having difficulty with the Zoom meetings and just decided to retire.  He does have BPH and was scheduled to have surgery at [place] 10/28/21 (wife believes he was going to have a Laser TURP), but called to [place] and Dr. Redman will reschedule the surgery.  The last 3 months he has been more depressed, and now eating very little and not sleeping at night.  Clonazepam was stopped as it may have been contributing to cognitive impairment.     He was started on Remeron 15 mg at bedtime, and initially he was getting up several times a night and could not fall back to sleep, and was eating minimal amounts, and said he was thirsty but refusing to drink water or juice, saying it was poisoned.  By discharge he was sleeping through the night, eating well, and memory much improved -- he knew the president, and , and he was able to give additional background on the vice presidents background.  Also were able to remove the mora catheter and he was able to urinate on his own, and eventually his bowels moved with laxatives once he began eating.      He was screened by psychiatry and scheduled with [facility] as an outpatient, and wife will call if he worsens before then.  [facility] plans to reschedule he prostate surgery 2 weeks out or more, to give time for him to continue to mentally improve.      He was started on Uroxatrol about 3 months ago, wife wondered if that contributed to any of the psych problems.  He was on Flomax prior to that, and he was switched back to Flomax 0.8 mg daily just in case Uroxatrol may have played any role in his depression.  Suspect depression more situational, less outgoing because of Covid and fears of infection, and recent shelter from a high powered executive position.     Les Hoang MD  Pager:  [phone]  Cell Phone:  [phone]       Significant Results and Procedures   As above    Pending Results   These results will be followed up by Dr. Thao  Unresulted Labs Ordered in the Past 30 Days of this Admission     No orders found from 9/22/2021 to 10/23/2021.          Code Status   Full Code       Primary Care Physician   Jem Dee    Physical Exam                      Vitals:    10/22/21 1847 10/25/21 0500   Weight: 72.6 kg (160 lb) 68.9 kg (151 lb 14.4 oz)     Vital Signs with Ranges     No intake/output data recorded.    Exam on discharge:   Lungs clear  CV with reg S1S2  Neuro -- alert, Ox3, and improvement mood    Discharge Disposition   Discharged to home  Condition at discharge: Good    Consultations This Hospital Stay   PHARMACY IP CONSULT  NUTRITION SERVICES ADULT IP CONSULT  PSYCHIATRY IP CONSULT  CARE MANAGEMENT / SOCIAL WORK IP CONSULT  PSYCHIATRY IP CONSULT    Time Spent on this Encounter   I spent a total of 35 minutes discharging this patient.     Discharge Orders      Reason for your hospital stay    Psychotic depression     Activity    Your activity upon discharge: activity as tolerated     Discharge Instructions    Call Dr. Thao if any medical questions at Cell Phone [phone].     Follow-up and recommended labs and tests     [facility]   [address], [place] [id number] [phone]  Psychiatry: Funmi Blanchard   November 10th, 21 at 10:50am  Follow Up-December 8th, 2021 at 10:50am. For the first appointment please arrive 30 minutes early to complete paperwork.    CASE MANAGEMENT: PLEASE FAX discharge summary and orders when available to [phone]. Thank you!         [facility]  [address] [place] [address], [address]  [place] [address]  Phone: [phone]  Fax: [phone]  Mental Health Counseling: Jesu العراقي   December 2nd, 2021 at 2:00pm   Please call to provide them with your e-mail for paperwork at  [phone] opt 1.     Diet    Follow this diet upon discharge: Orders Placed This Encounter      Snacks/Supplements Adult: Ensure Enlive; Between Meals      Regular Diet Adult (safe tray)     Discharge Medications   Discharge Medication List as of 10/28/2021  1:07 PM      START taking these medications    Details   senna-docusate (SENOKOT-S/PERICOLACE) 8.6-50 MG tablet Take 2 tablets by mouth 2 times daily, Disp-100 tablet, R-1, E-PrescribeHold medication if loose stools      tamsulosin (FLOMAX) 0.4 MG capsule Take 2 capsules (0.8 mg) by mouth daily, Disp-30 capsule, R-1, E-Prescribe         CONTINUE these medications which have CHANGED    Details   mirtazapine (REMERON SOL-TAB) 15 MG ODT 1 tablet (15 mg) by Orally disintegrating tablet route At Bedtime, Disp-30 tablet, R-1, Local Print         CONTINUE these medications which have NOT CHANGED    Details   aspirin 81 MG EC tablet Take 81 mg by mouth daily., 81 mg, Oral, DAILY, Until Discontinued, Historical      atorvastatin (LIPITOR) 10 MG tablet Take 10 mg by mouth daily., Historical      brinzolamide-brimonidine (SIMBRINZA) 1-0.2 % ophthalmic suspension Place 1 drop into both eyes 2 times daily, Historical      ciclopirox (PENLAC) 8 % external solution Apply to adjacent skin and affected nails daily.  Remove with alcohol every 7 days, then repeat.Historical      ipratropium (ATROVENT) 0.03 % nasal spray Spray 2 sprays into both nostrils 3 times daily, Historical      latanoprost (XALATAN) 0.005 % ophthalmic solution Place 1 drop into both eyes daily, Historical      Multiple Vitamin (MULTIVITAMIN OR) Take 1 tablet by mouth daily , Historical      omega-3 acid ethyl esters (LOVAZA) 1 G capsule Take 2 g by mouth 2 times daily, Historical      Probiotic Product (PROBIOTIC PO) Take 1 tablet by mouth daily , Historical      tadalafil (CIALIS) 5 MG tablet Take 5 mg by mouth daily as needed, Historical         STOP taking these medications       alfuzosin ER  (UROXATRAL) 10 MG 24 hr tablet Comments:   Reason for Stopping:         clonazePAM (KLONOPIN) 0.5 MG tablet Comments:   Reason for Stopping:         escitalopram (LEXAPRO) 10 MG tablet Comments:   Reason for Stopping:         nystatin (MYCOSTATIN) 535422 UNIT/ML suspension Comments:   Reason for Stopping:             Allergies   Allergies   Allergen Reactions     Simvastatin      Muscle ache     Data   Most Recent 3 CBC's:  Recent Labs   Lab Test 10/26/21  0610 10/24/21  1023 10/22/21  2019   WBC 4.5 4.2 3.6*   HGB 12.9* 13.1* 11.8*   MCV 99 96 96    196 172      Most Recent 3 BMP's:  Recent Labs   Lab Test 10/26/21  0610 10/24/21  1023 10/22/21  2019    139 140   POTASSIUM 4.3 3.8 3.9   CHLORIDE 112* 107 108   CO2 26 25 27   BUN 25 18 19   CR 1.27* 1.02 1.08   ANIONGAP 5 7 5   POOL 9.0 9.0 9.1   * 93 100*     Most Recent 2 LFT's:  Recent Labs   Lab Test 10/24/21  1023   AST 28   ALT 32   ALKPHOS 47   BILITOTAL 0.9     Most Recent INR's and Anticoagulation Dosing History:  Anticoagulation Dose History    There is no flowsheet data to display.       Most Recent 3 Troponin's:  Recent Labs   Lab Test 10/22/21  2019 09/12/17  1528   TROPI  --  <0.015   TROPONIN <0.015  --      Most Recent Cholesterol Panel:No lab results found.  Most Recent 6 Bacteria Isolates From Any Culture (See EPIC Reports for Culture Details):No lab results found.  Most Recent TSH, T4 and A1c Labs:  Recent Labs   Lab Test 10/22/21  2019   TSH 1.28

## 2021-10-27 NOTE — DISCHARGE INSTRUCTIONS
"[facility]   [address], [place] [address]   [phone]  Psychiatry: Funmi Blanchard   November 10th, 21 at 10:50am  Follow Up-December 8th, 2021 at 10:50am. For the first appointment please arrive 30 minutes early to complete paperwork.      [facility]  [address], [address]  [place] [address]  Phone: [phone]  Fax: [phone]  Mental Health Counseling: Jesu العراقي   December 2nd, 2021 at 2:00pm   Please call to provide them with your e-mail for paperwork at [phone] opt 1.     Resources for after discharge if necessary include:   National [place] on Mental Illness (LILI) [place]: Connect for help, to navigate the mental health system, and for support and for resources.  Call: 2-198-CHTC-Helps / 1-158.374.5377    Crisis Text Line: The 24/7 emergency service is available if you or someone you know is experiencing a psychiatric or mental health crisis.  Text: \"[place]\" to 668652    [place] Warmline: Are you an adult needing support? Talk to a specialist who has firsthand experience living with a mental health condition.  Call: 180.860.2118   Text: \"Support\" to 89641    National Suicide Prevention Lifeline: The 24/7 lifeline provides support when in distress, has prevention and crisis resources for you or your loved ones, and resources for professionals.  Call 3-158-736-CITH (5779)    Peer Support Connection Warmlines: Lbpe-cz-ohna telephone support that s safe and supportive. Open 5 p.m. to 9 a.m.  Call or text: 1-832.255.5673     COVID Cares Stress Phone Support Service. Any [place] experiencing stress can call 213-OSFB0JT (390-805-1941) for free telephone support from 9am to 9pm every day. The service is a collaboration with volunteers from the [place] Psychiatric Society, the [place] Psychological Association, the [place] [employer] Psych[employer], and Mental Health [place]. The free service is also accessible at " Angelantoni.org where searchers can also find psychiatric and mental health services availability and real-time Substance Use Disorder Treatment program openings.    Alzheimer's Association of [place] and [place]  24/7 HELPLINE  [phone]

## 2021-10-27 NOTE — PLAN OF CARE
Pt is A/O to self. Pt is confused, paranoid, fearful. Needs reassurance. VSS on RA. Denies pain. Up A1, GB. Ambulated in tello 1x this shift. R PIV SL. Chirinos removed at 1000, due to void. Bladder scan 139 ml at 1500. Last BM was 3-4 days ago, per wife. Miralax & senna given. Wife at the bedside. Suicidal ideations denied, precautions discontinued. Regular diet, needs a lot of encouragement. Discharge pending.

## 2021-10-27 NOTE — PLAN OF CARE
Pt A/O to self. Soft spoken, anxious and paranoid. Slow to respond. VSS on RA. Denies pain. Up SBA in room. PIV SL. Pt voided this shift, PVR 31ml. No BM this shift. Regular diet, needs encouragement. Denies suicidal ideation. Plan to discharge tomorrow morning.

## 2021-10-28 NOTE — DISCHARGE SUMMARY
Discharge Note    Patient discharged to home via private vehicle  accompanied by significant other .  IV: Discontinued  Prescriptions filled and given to patient/family and sent with patient to discharge facility .   Belongings reviewed and sent with family.   Home medications returned to patient: NA  Equipment sent with: patient, N/A.   family & patient verbalizes understanding of discharge instructions. AVS given to patient and family.

## 2021-10-28 NOTE — PLAN OF CARE
Alert to self only this shift.  Patient was restless and his wife requested that VS and full assessment not be done because 'it would probably agitate him more'.  Denies pain except for abdominal discomfort d/t constipation; Senna given.  Denies difficulty voiding; did not call or inform us that he had voided, therefore a PVR was not gotten. One bladder scan obtained  this shift; 147 mL.  PIV SL.  Tolerating regular diet without problems.  SBA in room; wife stayed with patient to assist in calming patient.  Planned discharge home this morning.

## 2021-10-29 NOTE — TELEPHONE ENCOUNTER
What type of discharge? Inpatient  Risk of Hospital admission or ED visit: 11%  Is a TCM episode required? Yes  When should the patient follow up with PCP? 14 days of discharge.    Rito Maynard RN  [facility]

## 2021-10-29 NOTE — TELEPHONE ENCOUNTER
Patient Contact    Attempt # 1    Was call answered?  No.  Left message on voicemail with information to call triage back.    On call back:     - Hospital Follow-up

## 2021-11-04 PROBLEM — R07.9 CHEST PAIN: Status: ACTIVE | Noted: 2019-03-31

## 2021-11-04 PROBLEM — I82.462 ACUTE DEEP VEIN THROMBOSIS (DVT) OF CALF MUSCLE VEIN OF LEFT LOWER EXTREMITY (H): Status: ACTIVE | Noted: 2021-01-01

## 2021-11-04 PROBLEM — N18.30 CKD (CHRONIC KIDNEY DISEASE) STAGE 3, GFR 30-59 ML/MIN (H): Status: ACTIVE | Noted: 2021-01-01

## 2021-11-04 PROBLEM — I82.4Y9 DEEP VEIN THROMBOSIS (DVT) OF PROXIMAL LOWER EXTREMITY (H): Status: ACTIVE | Noted: 2021-01-01

## 2021-11-04 NOTE — PROGRESS NOTES
Assessment and Plan  1. Psychotic depression (H)  2. Cognitive impairment with Agitation  Recent hospitalization and D/C on 10/28/21 for 1 week for SI and currently on Clonazepan TID PRN ,  shows 10 tabs given by ER but currently not taking as per the patient and wofe.  Pt has been on it in the past for couple of doses which has been causing cognitive deficits and was stopped. He does have upcoming appointment with Psychiatry at [facility].  Supposed to have PVP ( Photo Vaporization of Prostate )  for his BPH at [place] in 10/28/21 and was postponed. Last labs on 10/26/21 with CKD , HBG at 13 improved from 11.   - Pt states he is much improved, no symptoms of SI/HI at this time.   - Continue current Rameron at 15 mg daily and follow up with Psychiatry. Emphasized to give it some time atleast couple of weeks before we need changes on the dosages. Pt understood and agreed with the plan.     3. Acute deep vein thrombosis (DVT) of calf muscle vein of left lower extremity (H)  Newly diagnosed around 3 weeks back and was managed conservatively at [facility] which pt wife has shown me to [facility] on the cell phone ( Which I dont see on the Epic ) . As per the plan, they have done Suviellance USG DVT of the left Soleal.V DVT and he was not given any OAC. Supposed to follow Thrombophilia clinic at [facility] .     4. Slow transit constipation  Stable, Continue current Miralax.   - polyethylene glycol (MIRALAX) 17 GM/Dose powder; Take 17 g (1 capful) by mouth daily  Dispense: 500 g; Refill: 0    5. Dyslipidemia  Currently on Atorvastatin 10 mg daily. Will recheck and do changes if needed.   - Lipid panel reflex to direct LDL Fasting; Future    6. IFG (impaired fasting glucose)  - Hemoglobin A1c; Future    7. Need for hepatitis C screening test  - Hepatitis C Screen Reflex to HCV RNA Quant and Genotype; Future    8. Stage 3a chronic kidney disease (H)  New diagnosis added to pt list. He had similar suck low eGFR in the past. To avoid  Nephrotoxins emphasized.     WIFE # Anita has signed Consent to communicate for discussing the medical conditions of the patient. Most of her recent [facility] records are not yet available on web which she will send us to [employer].     Over 60 minutes spent on reviewing patient chart,  face to face encounter, greater than 50% time spent with plan/cordination of care and documentation as above in my A/P.         Patient Instructions   As discussed , please do the fasting lab with LAB ONLY appointment.     Please follow up with [facility] with Psychiatry. Continue the current medication .     Follow up with your Urology an Thrombophlia Clinic at [place] as opted.     ============      Safety plan reviewed. To the Emergency Department as needed or call after hours crisis line at [phone] or [phone]. [place] Crisis Text Line: Text [place] to 664941  or  Suicide LifeLine Chat: suicideRedeem&Get.org/chat/    Follow up with primary care provider as planned or for acute medical concerns.        Crisis Resources:    National Suicide Prevention Lifeline: 910.773.2474 (TTY: 592.203.1531). Call anytime for help.  (www.suicidepreventionlifeline.org)  National [place] on Mental Illness (www.earl.org): 633.277.2874 or 555-058-6038.   Mental Health Association (www.mentalhealth.org): 588.346.4200 or 262-867-6907.  [place] Crisis Text Line: Text [place] to 585084  Suicide LifeLine Chat: suicideRedeem&Get.org/chat        Return in about 3 months (around 2/4/2022), or if symptoms worsen or fail to improve, for Annual Wellness Exam.    Dayan Nguyen MD  [facility] ROBBY Duong is a 76 year old who presents for the following health issues       HPI       Hospital Follow-up Visit:    Hospital/Nursing Home/IP Rehab Facility: [facility]  Date of Admission: 10/22/2021  Date of Discharge: 10/28/2021  Reason(s) for Admission:  Psychotic depression      Was your hospitalization related to COVID-19? No   Problems taking medications regularly:  None  Medication changes since discharge: None  Problems adhering to non-medication therapy:  None    Summary of hospitalization:  [facility] discharge summary reviewed  Diagnostic Tests/Treatments reviewed.  Follow up needed: [facility] for psychiatry, Urology at [facility] for BPH and need of PVP procedure which was scheduled before this hospiatlization  Other Healthcare Providers Involved in Patient s Care:         Homecare  Update since discharge: improved.       Post Discharge Medication Reconciliation: discharge medications reconciled, continue medications without change.  Plan of care communicated with patient and family                Pt is new to me to establish care . He has last seen our group in 2017 and will be new to .        Allergies   Allergen Reactions     Simvastatin      Muscle ache        Past Medical History:   Diagnosis Date     JOINT PAIN-SHLDER 9/6/2007     MIXED HYPERLIPIDEMIA 9/6/2007       Past Surgical History:   Procedure Laterality Date     COLONOSCOPY  2007    [place] [facility]     HC REPAIR SLIDING INGUINAL HERNIA       HEMORRHOIDECTOMY       ROTATOR CUFF REPAIR RT/LT Right 03/2017    in [place]       Family History   Problem Relation Age of Onset     Respiratory Mother      Hypertension Father      Breast Cancer Sister      Breast Cancer Sister      Breast Cancer Sister        Social History     Tobacco Use     Smoking status: Never Smoker     Smokeless tobacco: Never Used   Substance Use Topics     Alcohol use: Yes     Comment: social        Current Outpatient Medications   Medication     atorvastatin (LIPITOR) 10 MG tablet     brinzolamide-brimonidine (SIMBRINZA) 1-0.2 % ophthalmic suspension     cholecalciferol (VITAMIN D3) 25 mcg (1000 units) capsule     ciclopirox (PENLAC) 8 % external solution     ipratropium (ATROVENT) 0.03 % nasal spray     latanoprost  (XALATAN) 0.005 % ophthalmic solution     mirtazapine (REMERON SOL-TAB) 15 MG ODT     Multiple Vitamin (MULTIVITAMIN OR)     omega-3 acid ethyl esters (LOVAZA) 1 G capsule     polyethylene glycol (MIRALAX) 17 GM/Dose powder     Probiotic Product (PROBIOTIC PO)     tadalafil (CIALIS) 5 MG tablet     tamsulosin (FLOMAX) 0.4 MG capsule     aspirin 81 MG EC tablet     senna-docusate (SENOKOT-S/PERICOLACE) 8.6-50 MG tablet     No current facility-administered medications for this visit.        Review of Systems   Constitutional, HEENT, cardiovascular, pulmonary, GI, , musculoskeletal, neuro, skin, endocrine and psych systems are negative, except as otherwise noted.      Objective    /80   Pulse 83   Temp 97.2  F (36.2  C) (Tympanic)   Resp 16   Ht 1.829 m (6')   Wt 71.2 kg (157 lb)   SpO2 100%   BMI 21.29 kg/m    Body mass index is 21.29 kg/m .  Physical Exam   GENERAL: healthy, alert and no distress  NECK: no adenopathy, no asymmetry, masses, or scars and thyroid normal to palpation  RESP: lungs clear to auscultation - no rales, rhonchi or wheezes  CV: regular rate and rhythm, normal S1 S2, no S3 or S4, no murmur, click or rub, no peripheral edema and peripheral pulses strong  ABDOMEN: soft, nontender, no hepatosplenomegaly, no masses and bowel sounds normal  MS: no gross musculoskeletal defects noted, no edema  NEURO : CN 2-12 normal, No motor or Sensory deficits. AO x3.     Labs and imaging reviewed and discussed with the patient.    Answers for HPI/ROS submitted by the patient on 11/4/2021  If you checked off any problems, how difficult have these problems made it for you to do your work, take care of things at home, or get along with other people?: Somewhat difficult  PHQ9 TOTAL SCORE: 6

## 2021-11-04 NOTE — LETTER
My Depression Action Plan  Name: Ad Bryan   Date of Birth 1945  Date: 11/4/2021    My doctor: Dayan Nguyen   My clinic: [place] [id number] [facility] [address]  [phone]          GREEN    ZONE   Good Control    What it looks like:     Things are going generally well. You have normal ups and downs. You may even feel depressed from time to time, but bad moods usually last less than a day.   What you need to do:  1. Continue to care for yourself (see self care plan)  2. Check your depression survival kit and update it as needed  3. Follow your physician s recommendations including any medication.  4. Do not stop taking medication unless you consult with your physician first.           YELLOW         ZONE Getting Worse    What it looks like:     Depression is starting to interfere with your life.     It may be hard to get out of bed; you may be starting to isolate yourself from others.    Symptoms of depression are starting to last most all day and this has happened for several days.     You may have suicidal thoughts but they are not constant.   What you need to do:     1. Call your care team. Your response to treatment will improve if you keep your care team informed of your progress. Yellow periods are signs an adjustment may need to be made.     2. Continue your self-care.  Just get dressed and ready for the day.  Don't give yourself time to talk yourself out of it.    3. Talk to someone in your support network.    4. Open up your Depression Self-Care Plan/Wellness Kit.           RED    ZONE Medical Alert - Get Help    What it looks like:     Depression is seriously interfering with your life.     You may experience these or other symptoms: You can t get out of bed most days, can t work or engage in other necessary activities, you have trouble taking care of basic hygiene, or basic responsibilities, thoughts of suicide or death that  will not go away, self-injurious behavior.     What you need to do:  1. Call your care team and request a same-day appointment. If they are not available (weekends or after hours) call your local crisis line, emergency room or 911.          Depression Self-Care Plan / Wellness Kit    Many people find that medication and therapy are helpful treatments for managing depression. In addition, making small changes to your everyday life can help to boost your mood and improve your wellbeing. Below are some tips for you to consider. Be sure to talk with your medical provider and/or behavioral health consultant if your symptoms are worsening or not improving.     Sleep   Sleep hygiene  means all of the habits that support good, restful sleep. It includes maintaining a consistent bedtime and wake time, using your bedroom only for sleeping or sex, and keeping the bedroom dark and free of distractions like a computer, smartphone, or television.     Develop a Healthy Routine  Maintain good hygiene. Get out of bed in the morning, make your bed, brush your teeth, take a shower, and get dressed. Don t spend too much time viewing media that makes you feel stressed. Find time to relax each day.    Exercise  Get some form of exercise every day. This will help reduce pain and release endorphins, the  feel good  chemicals in your brain. It can be as simple as just going for a walk or doing some gardening, anything that will get you moving.      Diet  Strive to eat healthy foods, including fruits and vegetables. Drink plenty of water. Avoid excessive sugar, caffeine, alcohol, and other mood-altering substances.     Stay Connected with Others  Stay in touch with friends and family members.    Manage Your Mood  Try deep breathing, massage therapy, biofeedback, or meditation. Take part in fun activities when you can. Try to find something to smile about each day.     Psychotherapy  Be open to working with a therapist if your provider  recommends it.     Medication  Be sure to take your medication as prescribed. Most anti-depressants need to be taken every day. It usually takes several weeks for medications to work. Not all medicines work for all people. It is important to follow-up with your provider to make sure you have a treatment plan that is working for you. Do not stop your medication abruptly without first discussing it with your provider.    Crisis Resources   These hotlines are for both adults and children. They and are open 24 hours a day, 7 days a week unless noted otherwise.      National Suicide Prevention Lifeline   7-862-897-XDQJ (6255)      Crisis Text Line    www.crisistextline.org  Text HOME to 028016 from anywhere in the [place], anytime, about any type of crisis. A live, trained crisis counselor will receive the text and respond quickly.      [place] Lifeline for LGBTQ Youth  A national crisis intervention and suicide lifeline for LGBTQ youth under 25. Provides a safe place to talk without judgement. Call 1-368.821.9699; text START to 115682 or visit www.thetrevorproject.org to talk to a trained counselor.      For [facility] crisis numbers, visit the [facility] website at:  https://[place].gov/dhs/people-we-serve/adults/health-care/mental-health/resources/crisis-contacts.jsp

## 2021-11-04 NOTE — PATIENT INSTRUCTIONS
As discussed , please do the fasting lab with LAB ONLY appointment.     Please follow up with [facility] with Psychiatry. Continue the current medication .     Follow up with your Urology an Thromboph[facility] Clinic at [place] as opted.     ============      Safety plan reviewed. To the Emergency Department as needed or call after hours crisis line at [phone] or [phone]. [place] Crisis Text Line: Text [place] to 494637  or  Suicide LifeLine Chat: [employer].org/chat/    Follow up with primary care provider as planned or for acute medical concerns.        Crisis Resources:    National Suicide Prevention Lifeline: 737.526.6670 (TTY: 329.944.8677). Call anytime for help.  (www.suicidepreventionlifeline.org)  National [place] on Mental Illness (www.earl.org): 632.997.4361 or 519-830-1660.   Mental Health Association (www.mentalhealth.org): 999.531.8506 or 502-165-9258.  [place] Crisis Text Line: Text [place] to 460478  Suicide LifeLine Chat: [employer].org/chat

## 2021-12-02 NOTE — LETTER
February 14, 2022      Ad Bryan  [address] [address] [address]        Dear Ad,    I care about your health and have reviewed your health plan. I have reviewed your medical conditions, medication list, and lab results and am making recommendations based on this review, to better manage your health.    You are in particular need of attention regarding:  -Cholesterol  -Wellness (Physical) Visit     I am recommending that you:  -schedule a WELLNESS (Physical) APPOINTMENT with me.   I will check fasting labs the same day - nothing to eat except water and meds for 8-10 hours prior.    Here is a list of Health Maintenance topics that are due now or due soon:  Health Maintenance Due   Topic Date Due     Kidney Microalbumin Urine Test  Never done     Hepatitis C Screening  Never done     Annual Wellness Visit  Never done     Cholesterol Lab  09/16/2012       Please call us at [phone] (or use [employer]) to address the above recommendations.     Thank you for trusting [facility] and we appreciate the opportunity to serve you.  We look forward to supporting your healthcare needs in the future.    Healthy Regards,    Dayan Nguyen MD

## 2021-12-02 NOTE — TELEPHONE ENCOUNTER
Patient Quality Outreach    Patient is due for the following:   Physical  - Due after 12/01/2020    NEXT STEPS:   Schedule a yearly physical    Type of outreach:    Sent [employer] message.    Next Steps:  Reach out within 90 days via [employer].    Max number of attempts reached: No. Will try again in 90 days if patient still on fail list.    Questions for provider review:    None     Kaycee Peoples

## 2022-01-21 NOTE — PROGRESS NOTES
Phone call    Wife called 1/20/2022,  depressed again, not suicidal but doesn't care if he dies.   Was seen at [facility] -- did not like the last psychologist, did not follow up, and now not taking his medications.      Called [facility] at [phone], they will call him and wife and set up follow-up appt.    Spoke with wife on 1/21/2022, has an appt in 10 days, she will monitor to see that he takes his medication, and she will call me if further problems.     Les Hoang MD  Pager: [phone]  Cell Phone:  [phone]

## 2022-02-14 NOTE — TELEPHONE ENCOUNTER
Patient Quality Outreach    Patient is due for the following:   Physical  - Due after unknown    NEXT STEPS:   Schedule a yearly physical    Type of outreach:    Phone, left message for patient/parent to call back., Sent MyChart message. and Sent letter.    Next Steps:  Reach out within 90 days via Phone, MyChart and Letter.    Max number of attempts reached: Yes. Will try again in 90 days if patient still on fail list.    Questions for provider review:    None     Kaycee Peoples

## 2022-04-17 ENCOUNTER — HEALTH MAINTENANCE LETTER (OUTPATIENT)
Age: 77
End: 2022-04-17

## 2022-10-23 ENCOUNTER — HEALTH MAINTENANCE LETTER (OUTPATIENT)
Age: 77
End: 2022-10-23

## 2023-06-02 NOTE — MR AVS SNAPSHOT
After Visit Summary   12/13/2017    Ad Bryan    MRN: [id number]           Patient Information     Date Of Birth          1945        Visit Information        Provider Department      12/13/2017 9:40 AM Jem Dee MD [facility] [place]        Today's Diagnoses     Foot mass, left    -  1    Need for hepatitis C screening test        Need for prophylactic vaccination and inoculation against influenza        Need for prophylactic vaccination against Streptococcus pneumoniae (pneumococcus)           Follow-ups after your visit        Future tests that were ordered for you today     Open Future Orders        Priority Expected Expires Ordered    US Extremity Non Vascular Bilateral Routine  12/13/2018 12/13/2017            Who to contact     If you have questions or need follow up information about today's clinic visit or your schedule please contact [facility] directly at [phone].  Normal or non-critical lab and imaging results will be communicated to you by MyChart, letter or phone within 4 business days after the clinic has received the results. If you do not hear from us within 7 days, please contact the clinic through MyChart or phone. If you have a critical or abnormal lab result, we will notify you by phone as soon as possible.  Submit refill requests through [employer] or call your pharmacy and they will forward the refill request to us. Please allow 3 business days for your refill to be completed.          Additional Information About Your Visit        MyChart Information     [employer] gives you secure access to your electronic health record. If you see a primary care provider, you can also send messages to your care team and make appointments. If you have questions, please call your primary care clinic.  If you do not have a primary care provider, please call [phone] and they will assist you.        Care EveryWhere ID     This is your Care EveryWhere  procedure time  Received: Today  Mignon Pérez  P  Asc Or Surgery Scheduling Msg Pool  Cc: Mignon Préez  Hello,     I just scheduled this patient with Dr. Amador on 07/18/23 at [address] [place] [address].  Patient would like the first procedure of the morning.  I forgot to add this to the prep for case.     Thank you,   Mignon     "ID. This could be used by other organizations to access your [facility] medical records  [id number]        Your Vitals Were     Pulse Temperature Respirations Height Pulse Oximetry BMI (Body Mass Index)    64 98.7  F (37.1  C) (Tympanic) 16 6' 2\" (1.88 m) 98% 23.24 kg/m2       Blood Pressure from Last 3 Encounters:   12/13/17 116/70   11/02/17 116/69   09/12/17 115/72    Weight from Last 3 Encounters:   12/13/17 181 lb (82.1 kg)   11/02/17 181 lb (82.1 kg)   09/12/17 180 lb 9.6 oz (81.9 kg)               Primary Care Provider Office Phone # Fax #    Jem RATNA Dee [phone]       [address] [facility] [address]        Equal Access to Services     [facility] : Hadii aad ku hadasho Soantoine, waaxda luqadaha, qaybta kaalmada adedillanyada, ti mendoza . So [facility] [phone].    ATENCIÓN: Si habla español, tiene a moore disposición servicios gratuitos de asistencia lingüística. Llame al [phone].    We comply with applicable federal civil rights laws and [place] laws. We do not discriminate on the basis of race, color, national origin, age, disability, sex, sexual orientation, or gender identity.            Thank you!     Thank you for choosing [facility]  for your care. Our goal is always to provide you with excellent care. Hearing back from our patients is one way we can continue to improve our services. Please take a few minutes to complete the written survey that you may receive in the mail after your visit with us. Thank you!             Your Updated Medication List - Protect others around you: Learn how to safely use, store and throw away your medicines at www.disposemymeds.org.          This list is accurate as of: 12/13/17 10:23 AM.  Always use your most recent med list.                   Brand Name Dispense Instructions for use Diagnosis    aspirin 81 MG EC tablet      Take 81 mg by mouth daily.        brimonidine 0.15 % ophthalmic " solution    ALPHAGAN-P     INT 1 GTT INTO OU BID        FLOMAX 0.4 MG capsule   Generic drug:  tamsulosin      Take 0.4 mg by mouth daily        LIPITOR 10 MG tablet   Generic drug:  atorvastatin      Take 10 mg by mouth daily.        MULTIVITAMIN PO      Take  by mouth.        omega-3 acid ethyl esters 1 G capsule    Lovaza     Take 2 g by mouth 2 times daily        PROBIOTIC PO      Take by mouth daily